# Patient Record
Sex: FEMALE | Race: BLACK OR AFRICAN AMERICAN | NOT HISPANIC OR LATINO | Employment: FULL TIME | URBAN - METROPOLITAN AREA
[De-identification: names, ages, dates, MRNs, and addresses within clinical notes are randomized per-mention and may not be internally consistent; named-entity substitution may affect disease eponyms.]

---

## 2017-04-03 ENCOUNTER — HISTORICAL (OUTPATIENT)
Dept: ADMINISTRATIVE | Facility: HOSPITAL | Age: 47
End: 2017-04-03

## 2017-04-17 ENCOUNTER — HISTORICAL (OUTPATIENT)
Dept: INTERNAL MEDICINE | Facility: CLINIC | Age: 47
End: 2017-04-17

## 2017-04-19 ENCOUNTER — HISTORICAL (OUTPATIENT)
Dept: INTERNAL MEDICINE | Facility: CLINIC | Age: 47
End: 2017-04-19

## 2017-04-25 LAB
HIGH RISK HPV 16 (PRECISION): NEGATIVE
HIGH RISK HPV 18/45 (PRECISION): NEGATIVE
PAP RECOMMENDATION EXT: NORMAL
PAP SMEAR: NORMAL

## 2017-05-05 ENCOUNTER — HISTORICAL (OUTPATIENT)
Dept: ADMINISTRATIVE | Facility: HOSPITAL | Age: 47
End: 2017-05-05

## 2017-07-13 ENCOUNTER — HISTORICAL (OUTPATIENT)
Dept: LAB | Facility: HOSPITAL | Age: 47
End: 2017-07-13

## 2017-07-13 LAB
HBV SURFACE AB SER-ACNC: 124.2 M[IU]/ML
HBV SURFACE AB SERPL IA-ACNC: REACTIVE M[IU]/ML

## 2017-11-14 ENCOUNTER — HISTORICAL (OUTPATIENT)
Dept: INTERNAL MEDICINE | Facility: CLINIC | Age: 47
End: 2017-11-14

## 2017-11-14 LAB — B-HCG SERPL QL: NEGATIVE

## 2017-12-29 ENCOUNTER — HOSPITAL ENCOUNTER (OUTPATIENT)
Dept: MEDSURG UNIT | Facility: HOSPITAL | Age: 47
End: 2017-12-30
Attending: INTERNAL MEDICINE | Admitting: INTERNAL MEDICINE

## 2017-12-29 LAB
ABS NEUT (OLG): 4.17 X10(3)/MCL (ref 2.1–9.2)
ALBUMIN SERPL-MCNC: 3.4 GM/DL (ref 3.4–5)
ALBUMIN/GLOB SERPL: 1 RATIO (ref 1–2)
ALP SERPL-CCNC: 104 UNIT/L (ref 45–117)
ALT SERPL-CCNC: 25 UNIT/L (ref 12–78)
ANISOCYTOSIS BLD QL SMEAR: NORMAL
AST SERPL-CCNC: 21 UNIT/L (ref 15–37)
BASOPHILS # BLD AUTO: 0.06 X10(3)/MCL
BASOPHILS NFR BLD AUTO: 1 % (ref 0–1)
BILIRUB SERPL-MCNC: 0.2 MG/DL (ref 0.2–1)
BILIRUBIN DIRECT+TOT PNL SERPL-MCNC: <0.1 MG/DL
BILIRUBIN DIRECT+TOT PNL SERPL-MCNC: ABNORMAL MG/DL
BUN SERPL-MCNC: 12 MG/DL (ref 7–18)
CALCIUM SERPL-MCNC: 8.3 MG/DL (ref 8.5–10.1)
CHLORIDE SERPL-SCNC: 110 MMOL/L (ref 98–107)
CO2 SERPL-SCNC: 27 MMOL/L (ref 21–32)
CREAT SERPL-MCNC: 0.7 MG/DL (ref 0.6–1.3)
CROSSMATCH INTERPRETATION: NORMAL
DACRYOCYTES BLD QL SMEAR: NORMAL
EOSINOPHIL # BLD AUTO: 0.15 X10(3)/MCL
EOSINOPHIL NFR BLD AUTO: 2 % (ref 0–5)
ERYTHROCYTE [DISTWIDTH] IN BLOOD BY AUTOMATED COUNT: 22.2 % (ref 11.5–14.5)
GLOBULIN SER-MCNC: 4.2 GM/ML (ref 2.3–3.5)
GLUCOSE SERPL-MCNC: 100 MG/DL (ref 74–106)
GROUP & RH: NORMAL
HCT VFR BLD AUTO: 26 % (ref 35–46)
HGB BLD-MCNC: 7.2 GM/DL (ref 12–16)
HYPOCHROMIA BLD QL SMEAR: NORMAL
IMM GRANULOCYTES # BLD AUTO: 0.03 10*3/UL
IMM GRANULOCYTES NFR BLD AUTO: 0 %
LYMPHOCYTES # BLD AUTO: 2.95 X10(3)/MCL
LYMPHOCYTES NFR BLD AUTO: 36 % (ref 15–40)
MACROCYTES BLD QL SMEAR: NORMAL
MCH RBC QN AUTO: 17.4 PG (ref 26–34)
MCHC RBC AUTO-ENTMCNC: 27.7 GM/DL (ref 31–37)
MCV RBC AUTO: 63 FL (ref 80–100)
MICROCYTES BLD QL SMEAR: NORMAL
MONOCYTES # BLD AUTO: 0.73 X10(3)/MCL
MONOCYTES NFR BLD AUTO: 9 % (ref 4–12)
NEUTROPHILS # BLD AUTO: 4.17 X10(3)/MCL
NEUTROPHILS NFR BLD AUTO: 52 X10(3)/MCL
OVALOCYTES BLD QL SMEAR: NORMAL
PLATELET # BLD AUTO: 374 X10(3)/MCL (ref 130–400)
PLATELET # BLD EST: NORMAL 10*3/UL
PMV BLD AUTO: ABNORMAL FL (ref 7.4–10.4)
POC BETA-HCG (QUAL): NEGATIVE
POIKILOCYTOSIS BLD QL SMEAR: NORMAL
POLYCHROMASIA BLD QL SMEAR: NORMAL
POTASSIUM SERPL-SCNC: 3.4 MMOL/L (ref 3.5–5.1)
PRODUCT READY: NORMAL
PROT SERPL-MCNC: 7.6 GM/DL (ref 6.4–8.2)
RBC # BLD AUTO: 4.13 X10(6)/MCL (ref 4–5.2)
RBC MORPH BLD: NORMAL
SODIUM SERPL-SCNC: 144 MMOL/L (ref 136–145)
TRANSFUSION ORDER: NORMAL
WBC # SPEC AUTO: 8.1 X10(3)/MCL (ref 4.5–11)

## 2017-12-30 LAB
ABS NEUT (OLG): 4.34 X10(3)/MCL (ref 2.1–9.2)
BASOPHILS # BLD AUTO: 0.08 X10(3)/MCL
BASOPHILS NFR BLD AUTO: 1 % (ref 0–1)
BUN SERPL-MCNC: 8 MG/DL (ref 7–18)
CALCIUM SERPL-MCNC: 8 MG/DL (ref 8.5–10.1)
CHLORIDE SERPL-SCNC: 109 MMOL/L (ref 98–107)
CO2 SERPL-SCNC: 27 MMOL/L (ref 21–32)
CREAT SERPL-MCNC: 0.6 MG/DL (ref 0.6–1.3)
EOSINOPHIL # BLD AUTO: 0.1 10*3/UL
EOSINOPHIL NFR BLD AUTO: 1 % (ref 0–5)
ERYTHROCYTE [DISTWIDTH] IN BLOOD BY AUTOMATED COUNT: 23.9 % (ref 11.5–14.5)
GLUCOSE SERPL-MCNC: 88 MG/DL (ref 74–106)
HCT VFR BLD AUTO: 35.3 % (ref 35–46)
HGB BLD-MCNC: 10.3 GM/DL (ref 12–16)
IMM GRANULOCYTES # BLD AUTO: 0.06 10*3/UL
IMM GRANULOCYTES NFR BLD AUTO: 1 %
LYMPHOCYTES # BLD AUTO: 2.41 X10(3)/MCL
LYMPHOCYTES NFR BLD AUTO: 32 % (ref 15–40)
MCH RBC QN AUTO: 19.7 PG (ref 26–34)
MCHC RBC AUTO-ENTMCNC: 29.2 GM/DL (ref 31–37)
MCV RBC AUTO: 67.4 FL (ref 80–100)
MONOCYTES # BLD AUTO: 0.64 X10(3)/MCL
MONOCYTES NFR BLD AUTO: 8 % (ref 4–12)
NEUTROPHILS # BLD AUTO: 4.34 X10(3)/MCL
NEUTROPHILS NFR BLD AUTO: 57 X10(3)/MCL
PLATELET # BLD AUTO: ABNORMAL 10*3/UL (ref 130–400)
PMV BLD AUTO: ABNORMAL FL (ref 7.4–10.4)
POTASSIUM SERPL-SCNC: 3.9 MMOL/L (ref 3.5–5.1)
RBC # BLD AUTO: 5.24 X10(6)/MCL (ref 4–5.2)
SODIUM SERPL-SCNC: 142 MMOL/L (ref 136–145)
WBC # SPEC AUTO: 7.6 X10(3)/MCL (ref 4.5–11)

## 2018-01-31 LAB — POC BETA-HCG (QUAL): NEGATIVE

## 2018-05-07 ENCOUNTER — HISTORICAL (OUTPATIENT)
Dept: INTERNAL MEDICINE | Facility: CLINIC | Age: 48
End: 2018-05-07

## 2018-05-24 ENCOUNTER — HISTORICAL (OUTPATIENT)
Dept: ADMINISTRATIVE | Facility: HOSPITAL | Age: 48
End: 2018-05-24

## 2018-05-24 LAB
ABS NEUT (OLG): 5.42 X10(3)/MCL (ref 2.1–9.2)
APPEARANCE, UA: CLEAR
BACTERIA #/AREA URNS AUTO: ABNORMAL /[HPF]
BASOPHILS # BLD AUTO: 0.05 X10(3)/MCL
BASOPHILS NFR BLD AUTO: 1 %
BILIRUB UR QL STRIP: NEGATIVE
CHOLEST SERPL-MCNC: 161 MG/DL
CHOLEST/HDLC SERPL: 5.4 {RATIO} (ref 0–4.4)
COLOR UR: ABNORMAL
EOSINOPHIL # BLD AUTO: 0.15 10*3/UL
EOSINOPHIL NFR BLD AUTO: 2 %
ERYTHROCYTE [DISTWIDTH] IN BLOOD BY AUTOMATED COUNT: 14.1 % (ref 11.5–14.5)
GLUCOSE (UA): NORMAL
HCT VFR BLD AUTO: 31 % (ref 35–46)
HDLC SERPL-MCNC: 30 MG/DL
HGB BLD-MCNC: 10 GM/DL (ref 12–16)
HGB UR QL STRIP: NEGATIVE
HIV 1+2 AB+HIV1 P24 AG SERPL QL IA: NONREACTIVE
HYALINE CASTS #/AREA URNS LPF: ABNORMAL /[LPF]
IMM GRANULOCYTES # BLD AUTO: 0.09 10*3/UL
IMM GRANULOCYTES NFR BLD AUTO: 1 %
KETONES UR QL STRIP: NEGATIVE
LDLC SERPL CALC-MCNC: 107 MG/DL (ref 0–130)
LEUKOCYTE ESTERASE UR QL STRIP: NEGATIVE
LYMPHOCYTES # BLD AUTO: 2.58 X10(3)/MCL
LYMPHOCYTES NFR BLD AUTO: 29 % (ref 13–40)
MCH RBC QN AUTO: 28.5 PG (ref 26–34)
MCHC RBC AUTO-ENTMCNC: 32.3 GM/DL (ref 31–37)
MCV RBC AUTO: 88.3 FL (ref 80–100)
MONOCYTES # BLD AUTO: 0.62 X10(3)/MCL
MONOCYTES NFR BLD AUTO: 7 % (ref 4–12)
NEUTROPHILS # BLD AUTO: 5.42 X10(3)/MCL
NEUTROPHILS NFR BLD AUTO: 61 X10(3)/MCL
NITRITE UR QL STRIP: NEGATIVE
PH UR STRIP: 8 [PH] (ref 4.5–8)
PLATELET # BLD AUTO: 337 X10(3)/MCL (ref 130–400)
PMV BLD AUTO: 11.7 FL (ref 7.4–10.4)
PROT UR QL STRIP: NEGATIVE
RBC # BLD AUTO: 3.51 X10(6)/MCL (ref 4–5.2)
RBC #/AREA URNS AUTO: ABNORMAL /[HPF]
SP GR UR STRIP: 1.02 (ref 1–1.03)
SQUAMOUS #/AREA URNS LPF: ABNORMAL /[LPF]
TRIGL SERPL-MCNC: 121 MG/DL
TSH SERPL-ACNC: 0.74 MIU/L (ref 0.36–3.74)
UROBILINOGEN UR STRIP-ACNC: 4 MG/DL
VLDLC SERPL CALC-MCNC: 24 MG/DL
WBC # SPEC AUTO: 8.9 X10(3)/MCL (ref 4.5–11)
WBC #/AREA URNS AUTO: ABNORMAL /HPF

## 2019-01-08 ENCOUNTER — HISTORICAL (OUTPATIENT)
Dept: INTERNAL MEDICINE | Facility: CLINIC | Age: 49
End: 2019-01-08

## 2019-01-08 LAB
ABS NEUT (OLG): 4.13 X10(3)/MCL (ref 2.1–9.2)
BASOPHILS # BLD AUTO: 0.08 X10(3)/MCL
BASOPHILS NFR BLD AUTO: 1 %
EOSINOPHIL # BLD AUTO: 0.17 10*3/UL
EOSINOPHIL NFR BLD AUTO: 2 %
ERYTHROCYTE [DISTWIDTH] IN BLOOD BY AUTOMATED COUNT: 13 % (ref 11.5–14.5)
EST. AVERAGE GLUCOSE BLD GHB EST-MCNC: 143 MG/DL
HAV IGM SERPL QL IA: NONREACTIVE
HBA1C MFR BLD: 6.6 % (ref 4.2–6.3)
HBV CORE IGM SERPL QL IA: NONREACTIVE
HBV SURFACE AG SERPL QL IA: NEGATIVE
HCT VFR BLD AUTO: 39.2 % (ref 35–46)
HCV AB SERPL QL IA: NONREACTIVE
HGB BLD-MCNC: 12.3 GM/DL (ref 12–16)
IMM GRANULOCYTES # BLD AUTO: 0.06 10*3/UL
IMM GRANULOCYTES NFR BLD AUTO: 1 %
LYMPHOCYTES # BLD AUTO: 2.58 X10(3)/MCL
LYMPHOCYTES NFR BLD AUTO: 33 % (ref 13–40)
MCH RBC QN AUTO: 26.9 PG (ref 26–34)
MCHC RBC AUTO-ENTMCNC: 31.4 GM/DL (ref 31–37)
MCV RBC AUTO: 85.6 FL (ref 80–100)
MONOCYTES # BLD AUTO: 0.76 X10(3)/MCL
MONOCYTES NFR BLD AUTO: 10 % (ref 4–12)
NEUTROPHILS # BLD AUTO: 4.13 X10(3)/MCL
NEUTROPHILS NFR BLD AUTO: 53 X10(3)/MCL
PLATELET # BLD AUTO: 298 X10(3)/MCL (ref 130–400)
PMV BLD AUTO: 11.9 FL (ref 7.4–10.4)
RBC # BLD AUTO: 4.58 X10(6)/MCL (ref 4–5.2)
WBC # SPEC AUTO: 7.8 X10(3)/MCL (ref 4.5–11)

## 2019-05-09 ENCOUNTER — HISTORICAL (OUTPATIENT)
Dept: RADIOLOGY | Facility: HOSPITAL | Age: 49
End: 2019-05-09

## 2019-07-03 ENCOUNTER — HISTORICAL (OUTPATIENT)
Dept: INTERNAL MEDICINE | Facility: CLINIC | Age: 49
End: 2019-07-03

## 2019-07-03 LAB
ABS NEUT (OLG): 3.83 X10(3)/MCL (ref 2.1–9.2)
ALBUMIN SERPL-MCNC: 3.7 GM/DL (ref 3.4–5)
ALBUMIN/GLOB SERPL: 1 RATIO (ref 1.1–2)
ALP SERPL-CCNC: 95 UNIT/L (ref 45–117)
ALT SERPL-CCNC: 34 UNIT/L (ref 12–78)
APPEARANCE, UA: ABNORMAL
AST SERPL-CCNC: 27 UNIT/L (ref 15–37)
BACTERIA #/AREA URNS AUTO: ABNORMAL /[HPF]
BASOPHILS # BLD AUTO: 0.06 X10(3)/MCL
BASOPHILS NFR BLD AUTO: 1 %
BILIRUB SERPL-MCNC: 0.5 MG/DL (ref 0.2–1)
BILIRUB UR QL STRIP: NEGATIVE
BILIRUBIN DIRECT+TOT PNL SERPL-MCNC: 0.1 MG/DL
BILIRUBIN DIRECT+TOT PNL SERPL-MCNC: 0.4 MG/DL
BUN SERPL-MCNC: 10 MG/DL (ref 7–18)
CALCIUM SERPL-MCNC: 8.9 MG/DL (ref 8.5–10.1)
CHLORIDE SERPL-SCNC: 105 MMOL/L (ref 98–107)
CHOLEST SERPL-MCNC: 177 MG/DL
CHOLEST/HDLC SERPL: 4.8 {RATIO} (ref 0–4.4)
CO2 SERPL-SCNC: 29 MMOL/L (ref 21–32)
COLOR UR: YELLOW
CREAT SERPL-MCNC: 0.7 MG/DL (ref 0.6–1.3)
CREAT UR-MCNC: 243 MG/DL
EOSINOPHIL # BLD AUTO: 0.12 10*3/UL
EOSINOPHIL NFR BLD AUTO: 2 %
ERYTHROCYTE [DISTWIDTH] IN BLOOD BY AUTOMATED COUNT: 13.6 % (ref 11.5–14.5)
EST. AVERAGE GLUCOSE BLD GHB EST-MCNC: 131 MG/DL
GLOBULIN SER-MCNC: 3.6 GM/ML (ref 2.3–3.5)
GLUCOSE (UA): NORMAL
GLUCOSE SERPL-MCNC: 87 MG/DL (ref 74–106)
HBA1C MFR BLD: 6.2 % (ref 4.2–6.3)
HCT VFR BLD AUTO: 37.5 % (ref 35–46)
HDLC SERPL-MCNC: 37 MG/DL
HGB BLD-MCNC: 12.3 GM/DL (ref 12–16)
HGB UR QL STRIP: NEGATIVE
HYALINE CASTS #/AREA URNS LPF: ABNORMAL /[LPF]
IMM GRANULOCYTES # BLD AUTO: 0.02 10*3/UL
IMM GRANULOCYTES NFR BLD AUTO: 0 %
KETONES UR QL STRIP: NEGATIVE
LDLC SERPL CALC-MCNC: 123 MG/DL (ref 0–130)
LEUKOCYTE ESTERASE UR QL STRIP: 500 LEU/UL
LYMPHOCYTES # BLD AUTO: 2.94 X10(3)/MCL
LYMPHOCYTES NFR BLD AUTO: 39 % (ref 13–40)
MCH RBC QN AUTO: 27.6 PG (ref 26–34)
MCHC RBC AUTO-ENTMCNC: 32.8 GM/DL (ref 31–37)
MCV RBC AUTO: 84.1 FL (ref 80–100)
MICROALBUMIN UR-MCNC: 16.6 MG/L (ref 0–19)
MICROALBUMIN/CREAT RATIO PNL UR: 6.8 MCG/MG CR (ref 0–29)
MONOCYTES # BLD AUTO: 0.62 X10(3)/MCL
MONOCYTES NFR BLD AUTO: 8 % (ref 4–12)
NEUTROPHILS # BLD AUTO: 3.83 X10(3)/MCL
NEUTROPHILS NFR BLD AUTO: 50 X10(3)/MCL
NITRITE UR QL STRIP: NEGATIVE
PH UR STRIP: 5.5 [PH] (ref 4.5–8)
PLATELET # BLD AUTO: 269 X10(3)/MCL (ref 130–400)
PMV BLD AUTO: 11.3 FL (ref 7.4–10.4)
POTASSIUM SERPL-SCNC: 3.4 MMOL/L (ref 3.5–5.1)
PROT SERPL-MCNC: 7.3 GM/DL (ref 6.4–8.2)
PROT UR QL STRIP: 10 MG/DL
RBC # BLD AUTO: 4.46 X10(6)/MCL (ref 4–5.2)
RBC #/AREA URNS AUTO: ABNORMAL /[HPF]
SODIUM SERPL-SCNC: 140 MMOL/L (ref 136–145)
SP GR UR STRIP: 1.02 (ref 1–1.03)
SQUAMOUS #/AREA URNS LPF: >100 /[LPF]
T4 FREE SERPL-MCNC: 0.85 NG/DL (ref 0.76–1.46)
TRIGL SERPL-MCNC: 86 MG/DL
TSH SERPL-ACNC: 4.09 MIU/L (ref 0.36–3.74)
UROBILINOGEN UR STRIP-ACNC: NORMAL
VLDLC SERPL CALC-MCNC: 17 MG/DL
WBC # SPEC AUTO: 7.6 X10(3)/MCL (ref 4.5–11)
WBC #/AREA URNS AUTO: ABNORMAL /HPF

## 2020-02-12 ENCOUNTER — HISTORICAL (OUTPATIENT)
Dept: RADIOLOGY | Facility: HOSPITAL | Age: 50
End: 2020-02-12

## 2020-02-12 LAB
EST. AVERAGE GLUCOSE BLD GHB EST-MCNC: 140 MG/DL
HBA1C MFR BLD: 6.5 % (ref 4.2–6.3)

## 2020-02-23 LAB
LEFT EYE DM RETINOPATHY: NEGATIVE
RIGHT EYE DM RETINOPATHY: NEGATIVE

## 2020-04-30 ENCOUNTER — HISTORICAL (OUTPATIENT)
Dept: INTERNAL MEDICINE | Facility: CLINIC | Age: 50
End: 2020-04-30

## 2020-04-30 LAB
ABS NEUT (OLG): 4.82 X10(3)/MCL (ref 2.1–9.2)
APPEARANCE, UA: CLEAR
BACTERIA #/AREA URNS AUTO: ABNORMAL /HPF
BASOPHILS # BLD AUTO: 0.1 X10(3)/MCL (ref 0–0.2)
BASOPHILS NFR BLD AUTO: 1 %
BILIRUB UR QL STRIP: NEGATIVE
CHOLEST SERPL-MCNC: 179 MG/DL
CHOLEST/HDLC SERPL: 4.8 {RATIO} (ref 0–4.4)
COLOR UR: YELLOW
CREAT UR-MCNC: 257 MG/DL
EOSINOPHIL # BLD AUTO: 0.1 X10(3)/MCL (ref 0–0.9)
EOSINOPHIL NFR BLD AUTO: 2 %
ERYTHROCYTE [DISTWIDTH] IN BLOOD BY AUTOMATED COUNT: 21 % (ref 11.5–14.5)
EST. AVERAGE GLUCOSE BLD GHB EST-MCNC: 103 MG/DL
GLUCOSE (UA): NEGATIVE
HBA1C MFR BLD: 5.2 % (ref 4.2–6.3)
HCT VFR BLD AUTO: 34.4 % (ref 35–46)
HDLC SERPL-MCNC: 37 MG/DL (ref 40–59)
HGB BLD-MCNC: 9.9 GM/DL (ref 12–16)
HGB UR QL STRIP: NEGATIVE
HYALINE CASTS #/AREA URNS LPF: ABNORMAL /LPF
IMM GRANULOCYTES # BLD AUTO: 0.02 10*3/UL
IMM GRANULOCYTES NFR BLD AUTO: 0 %
KETONES UR QL STRIP: ABNORMAL
LDLC SERPL CALC-MCNC: 122 MG/DL
LEUKOCYTE ESTERASE UR QL STRIP: 75 LEU/UL
LYMPHOCYTES # BLD AUTO: 2 X10(3)/MCL (ref 0.6–4.6)
LYMPHOCYTES NFR BLD AUTO: 26 %
MCH RBC QN AUTO: 21.1 PG (ref 26–34)
MCHC RBC AUTO-ENTMCNC: 28.8 GM/DL (ref 31–37)
MCV RBC AUTO: 73.3 FL (ref 80–100)
MICROALBUMIN UR-MCNC: 10.3 MG/L (ref 0–19)
MICROALBUMIN/CREAT RATIO PNL UR: 4 MCG/MG CR (ref 0–29)
MONOCYTES # BLD AUTO: 0.7 X10(3)/MCL (ref 0.1–1.3)
MONOCYTES NFR BLD AUTO: 9 %
NEUTROPHILS # BLD AUTO: 4.82 X10(3)/MCL (ref 2.1–9.2)
NEUTROPHILS NFR BLD AUTO: 62 %
NITRITE UR QL STRIP: NEGATIVE
PH UR STRIP: 5.5 [PH] (ref 4.5–8)
PLATELET # BLD AUTO: 347 X10(3)/MCL (ref 130–400)
PMV BLD AUTO: ABNORMAL FL (ref 7.4–10.4)
PROT UR QL STRIP: NEGATIVE
RBC # BLD AUTO: 4.69 X10(6)/MCL (ref 4–5.2)
RBC #/AREA URNS AUTO: ABNORMAL /HPF
SP GR UR STRIP: 1.02 (ref 1–1.03)
SQUAMOUS #/AREA URNS LPF: >100 /LPF
T4 FREE SERPL-MCNC: 0.74 NG/DL (ref 0.76–1.46)
TRIGL SERPL-MCNC: 102 MG/DL
TSH SERPL-ACNC: 2.01 MIU/L (ref 0.36–3.74)
UROBILINOGEN UR STRIP-ACNC: NORMAL
VLDLC SERPL CALC-MCNC: 20 MG/DL
WBC # SPEC AUTO: 7.8 X10(3)/MCL (ref 4.5–11)
WBC #/AREA URNS AUTO: ABNORMAL /HPF

## 2020-05-02 LAB — FINAL CULTURE: NO GROWTH

## 2020-07-29 ENCOUNTER — HISTORICAL (OUTPATIENT)
Dept: INTERNAL MEDICINE | Facility: CLINIC | Age: 50
End: 2020-07-29

## 2020-07-29 LAB
EST. AVERAGE GLUCOSE BLD GHB EST-MCNC: 134 MG/DL
HBA1C MFR BLD: 6.3 % (ref 4.2–6.3)

## 2020-11-02 ENCOUNTER — HISTORICAL (OUTPATIENT)
Dept: INTERNAL MEDICINE | Facility: CLINIC | Age: 50
End: 2020-11-02

## 2020-11-02 LAB
ABS NEUT (OLG): 4.23 X10(3)/MCL (ref 2.1–9.2)
APPEARANCE, UA: CLEAR
BACTERIA #/AREA URNS AUTO: ABNORMAL /HPF
BASOPHILS # BLD AUTO: 0 X10(3)/MCL (ref 0–0.2)
BASOPHILS NFR BLD AUTO: 1 %
BILIRUB UR QL STRIP: NEGATIVE
BUN SERPL-MCNC: 11 MG/DL (ref 7–18.7)
CALCIUM SERPL-MCNC: 9.3 MG/DL (ref 8.4–10.2)
CHLORIDE SERPL-SCNC: 102 MMOL/L (ref 98–107)
CO2 SERPL-SCNC: 28 MMOL/L (ref 22–29)
COLOR UR: YELLOW
CREAT SERPL-MCNC: 0.76 MG/DL (ref 0.55–1.02)
CREAT/UREA NIT SERPL: 14
EOSINOPHIL # BLD AUTO: 0.1 X10(3)/MCL (ref 0–0.9)
EOSINOPHIL NFR BLD AUTO: 2 %
ERYTHROCYTE [DISTWIDTH] IN BLOOD BY AUTOMATED COUNT: 14.6 % (ref 11.5–14.5)
GLUCOSE (UA): NEGATIVE
GLUCOSE SERPL-MCNC: 104 MG/DL (ref 74–100)
HCT VFR BLD AUTO: 38.5 % (ref 35–46)
HGB BLD-MCNC: 12.4 GM/DL (ref 12–16)
HGB UR QL STRIP: NEGATIVE
HYALINE CASTS #/AREA URNS LPF: ABNORMAL /LPF
IMM GRANULOCYTES # BLD AUTO: 0.03 10*3/UL
IMM GRANULOCYTES NFR BLD AUTO: 0 %
KETONES UR QL STRIP: NEGATIVE
LEUKOCYTE ESTERASE UR QL STRIP: NEGATIVE
LYMPHOCYTES # BLD AUTO: 2.2 X10(3)/MCL (ref 0.6–4.6)
LYMPHOCYTES NFR BLD AUTO: 30 %
MCH RBC QN AUTO: 27.9 PG (ref 26–34)
MCHC RBC AUTO-ENTMCNC: 32.2 GM/DL (ref 31–37)
MCV RBC AUTO: 86.7 FL (ref 80–100)
MONOCYTES # BLD AUTO: 0.7 X10(3)/MCL (ref 0.1–1.3)
MONOCYTES NFR BLD AUTO: 9 %
NEUTROPHILS # BLD AUTO: 4.23 X10(3)/MCL (ref 2.1–9.2)
NEUTROPHILS NFR BLD AUTO: 58 %
NITRITE UR QL STRIP: NEGATIVE
PH UR STRIP: 6 [PH] (ref 4.5–8)
PLATELET # BLD AUTO: 256 X10(3)/MCL (ref 130–400)
PMV BLD AUTO: 12.7 FL (ref 7.4–10.4)
POTASSIUM SERPL-SCNC: 4 MMOL/L (ref 3.5–5.1)
PROT UR QL STRIP: NEGATIVE
RBC # BLD AUTO: 4.44 X10(6)/MCL (ref 4–5.2)
RBC #/AREA URNS AUTO: ABNORMAL /HPF
SODIUM SERPL-SCNC: 139 MMOL/L (ref 136–145)
SP GR UR STRIP: 1.03 (ref 1–1.03)
SQUAMOUS #/AREA URNS LPF: ABNORMAL /LPF
T4 FREE SERPL-MCNC: 0.79 NG/DL (ref 0.7–1.48)
TSH SERPL-ACNC: 1.96 UIU/ML (ref 0.35–4.94)
UROBILINOGEN UR STRIP-ACNC: NORMAL
WBC # SPEC AUTO: 7.3 X10(3)/MCL (ref 4.5–11)
WBC #/AREA URNS AUTO: ABNORMAL /HPF

## 2020-11-04 LAB — FINAL CULTURE: NORMAL

## 2021-02-03 ENCOUNTER — HISTORICAL (OUTPATIENT)
Dept: ADMINISTRATIVE | Facility: HOSPITAL | Age: 51
End: 2021-02-03

## 2021-02-03 LAB
BUN SERPL-MCNC: 8 MG/DL (ref 9.8–20.1)
CALCIUM SERPL-MCNC: 9.3 MG/DL (ref 8.4–10.2)
CHLORIDE SERPL-SCNC: 103 MMOL/L (ref 98–107)
CO2 SERPL-SCNC: 27 MMOL/L (ref 22–29)
CREAT SERPL-MCNC: 0.77 MG/DL (ref 0.55–1.02)
CREAT/UREA NIT SERPL: 10
EST. AVERAGE GLUCOSE BLD GHB EST-MCNC: 114 MG/DL
GLUCOSE SERPL-MCNC: 94 MG/DL (ref 74–100)
HBA1C MFR BLD: 5.6 %
POTASSIUM SERPL-SCNC: 3.9 MMOL/L (ref 3.5–5.1)
SODIUM SERPL-SCNC: 140 MMOL/L (ref 136–145)

## 2021-03-15 ENCOUNTER — HISTORICAL (OUTPATIENT)
Dept: RADIOLOGY | Facility: HOSPITAL | Age: 51
End: 2021-03-15

## 2022-01-18 ENCOUNTER — HISTORICAL (OUTPATIENT)
Dept: INTERNAL MEDICINE | Facility: CLINIC | Age: 52
End: 2022-01-18

## 2022-01-18 LAB
ABS NEUT (OLG): 3.75 X10(3)/MCL (ref 2.1–9.2)
ALBUMIN SERPL-MCNC: 4 GM/DL (ref 3.5–5)
ALBUMIN/GLOB SERPL: 1 RATIO (ref 1.1–2)
ALP SERPL-CCNC: 72 UNIT/L (ref 40–150)
ALT SERPL-CCNC: 19 UNIT/L (ref 0–55)
APPEARANCE, UA: CLEAR
AST SERPL-CCNC: 21 UNIT/L (ref 5–34)
BACTERIA SPEC CULT: ABNORMAL
BASOPHILS # BLD AUTO: 0 X10(3)/MCL (ref 0–0.2)
BASOPHILS NFR BLD AUTO: 1 %
BILIRUB SERPL-MCNC: 0.3 MG/DL
BILIRUB UR QL STRIP: NEGATIVE
BILIRUBIN DIRECT+TOT PNL SERPL-MCNC: 0.1 MG/DL (ref 0–0.5)
BILIRUBIN DIRECT+TOT PNL SERPL-MCNC: 0.2 MG/DL (ref 0–0.8)
BUN SERPL-MCNC: 10.8 MG/DL (ref 9.8–20.1)
CALCIUM SERPL-MCNC: 9.5 MG/DL (ref 8.7–10.5)
CHLORIDE SERPL-SCNC: 107 MMOL/L (ref 98–107)
CHOLEST SERPL-MCNC: 207 MG/DL
CHOLEST/HDLC SERPL: 5 {RATIO} (ref 0–5)
CO2 SERPL-SCNC: 26 MMOL/L (ref 22–29)
COLOR UR: ABNORMAL
CREAT SERPL-MCNC: 0.69 MG/DL (ref 0.55–1.02)
CREAT UR-MCNC: 197.3 MG/DL (ref 45–106)
EOSINOPHIL # BLD AUTO: 0.1 X10(3)/MCL (ref 0–0.9)
EOSINOPHIL NFR BLD AUTO: 1 %
ERYTHROCYTE [DISTWIDTH] IN BLOOD BY AUTOMATED COUNT: 17.1 % (ref 11.5–14.5)
EST. AVERAGE GLUCOSE BLD GHB EST-MCNC: 111.2 MG/DL
GLOBULIN SER-MCNC: 3.9 GM/DL (ref 2.4–3.5)
GLUCOSE (UA): NORMAL /UL
GLUCOSE SERPL-MCNC: 97 MG/DL (ref 74–100)
HBA1C MFR BLD: 5.5 %
HCT VFR BLD AUTO: 31.8 % (ref 35–46)
HDLC SERPL-MCNC: 44 MG/DL (ref 35–60)
HGB BLD-MCNC: 9.4 GM/DL (ref 12–16)
HGB UR QL STRIP: NEGATIVE /HPF
HYALINE CASTS #/AREA URNS LPF: ABNORMAL /LPF
IMM GRANULOCYTES # BLD AUTO: 0.05 10*3/UL
IMM GRANULOCYTES NFR BLD AUTO: 1 %
KETONES UR QL STRIP: NEGATIVE /UL
LDLC SERPL CALC-MCNC: 145 MG/DL (ref 50–140)
LEUKOCYTE ESTERASE UR QL STRIP: 75
LYMPHOCYTES # BLD AUTO: 2.3 X10(3)/MCL (ref 0.6–4.6)
LYMPHOCYTES NFR BLD AUTO: 34 %
MCH RBC QN AUTO: 23.9 PG (ref 26–34)
MCHC RBC AUTO-ENTMCNC: 29.6 GM/DL (ref 31–37)
MCV RBC AUTO: 80.7 FL (ref 80–100)
MICROALBUMIN UR-MCNC: 9.5 MG/L
MICROALBUMIN/CREAT RATIO PNL UR: 4.8 MG/GM CR (ref 0–30)
MONOCYTES # BLD AUTO: 0.7 X10(3)/MCL (ref 0.1–1.3)
MONOCYTES NFR BLD AUTO: 10 %
MUCOUS THREADS URNS QL MICRO: ABNORMAL /LPF
NEUTROPHILS # BLD AUTO: 3.75 X10(3)/MCL (ref 2.1–9.2)
NEUTROPHILS NFR BLD AUTO: 54 %
NITRITE UR QL STRIP: NEGATIVE
NRBC BLD AUTO-RTO: 0 % (ref 0–0.2)
PH UR STRIP: 6 /UL (ref 4.5–8)
PLATELET # BLD AUTO: 364 X10(3)/MCL (ref 130–400)
PMV BLD AUTO: 12 FL (ref 7.4–10.4)
POTASSIUM SERPL-SCNC: 4 MMOL/L (ref 3.5–5.1)
PROT SERPL-MCNC: 7.9 GM/DL (ref 6.4–8.3)
PROT UR QL STRIP: ABNORMAL /UL
RBC # BLD AUTO: 3.94 X10(6)/MCL (ref 4–5.2)
RBC #/AREA URNS HPF: ABNORMAL /HPF
SODIUM SERPL-SCNC: 140 MMOL/L (ref 136–145)
SP GR UR STRIP: 1.03 (ref 1–1.03)
SQUAMOUS EPITHELIAL, UA: ABNORMAL /HPF
T4 FREE SERPL-MCNC: 0.85 NG/DL (ref 0.7–1.48)
TRIGL SERPL-MCNC: 88 MG/DL (ref 37–140)
TSH SERPL-ACNC: 1.81 UIU/ML (ref 0.35–4.94)
UROBILINOGEN UR STRIP-ACNC: NORMAL /HPF
VLDLC SERPL CALC-MCNC: 18 MG/DL
WBC # SPEC AUTO: 7 X10(3)/MCL (ref 4.5–11)
WBC #/AREA URNS HPF: ABNORMAL /HPF

## 2022-01-20 LAB — FINAL CULTURE: NORMAL

## 2022-03-18 ENCOUNTER — HISTORICAL (OUTPATIENT)
Dept: RADIOLOGY | Facility: HOSPITAL | Age: 52
End: 2022-03-18

## 2022-03-18 ENCOUNTER — HISTORICAL (OUTPATIENT)
Dept: ADMINISTRATIVE | Facility: HOSPITAL | Age: 52
End: 2022-03-18

## 2022-04-04 LAB — HEMOCCULT STL QL IA: NEGATIVE

## 2022-04-10 ENCOUNTER — HISTORICAL (OUTPATIENT)
Dept: ADMINISTRATIVE | Facility: HOSPITAL | Age: 52
End: 2022-04-10
Payer: MEDICAID

## 2022-04-24 VITALS
DIASTOLIC BLOOD PRESSURE: 75 MMHG | OXYGEN SATURATION: 100 % | WEIGHT: 156.5 LBS | HEIGHT: 60 IN | BODY MASS INDEX: 30.73 KG/M2 | SYSTOLIC BLOOD PRESSURE: 110 MMHG

## 2022-04-30 NOTE — ED PROVIDER NOTES
Patient:   Sandra Vega             MRN: [id number]            FIN: [id number]               Age:   47 years     Sex:  Female     :  1970   Associated Diagnoses:   Symptomatic anemia   Author:   Julio Nunes MD      Basic Information   Time seen: Date & time 2017 22:35:00.   History source: Patient.   Arrival mode: Private vehicle.   History limitation: None.   Additional information: Chief Complaint from Nursing Triage Note : Chief Complaint   2017 22:00 CST     Chief Complaint           weakness x 1 week. on last day of cycle today. HX: anemia  .      History of Present Illness   The patient presents with weakness.  The onset was 2  days ago.  The course/duration of symptoms is constant.  The character of symptoms is generalized.  The degree at present is moderate.  Risk factors consist of none.  Prior episodes: occasional.  Therapy today: see nurses notes.  Associated symptoms: denies chest pain, denies abdominal pain, denies nausea, denies vomiting, denies shortness of breath, denies fever and denies chills.  Additional history: Reports having regular cycles approximately 1x/month lasting 5 days. Denies heavy bleeding.        Review of Systems   Constitutional symptoms:  Weakness, no fever, no chills.    Skin symptoms:  No rash,    Eye symptoms:  Vision unchanged.   ENMT symptoms:  No nasal congestion,    Respiratory symptoms:  No shortness of breath,    Cardiovascular symptoms:  No chest pain,    Gastrointestinal symptoms:  No abdominal pain,    Genitourinary symptoms:  No dysuria,    Musculoskeletal symptoms:  No back pain,    Neurologic symptoms:  No headache,    Psychiatric symptoms:  No substance abuse,    Endocrine symptoms:  No polyuria,    Allergy/immunologic symptoms:  No recurrent infections,       Health Status   Allergies: No known allergies.   Medications: Per nurse's notes.      Past Medical/ Family/ Social History   Medical history:    Active  Anxiety ([id number]).    Surgical history:     delivery ([id number]) in  at 27 Years.  csect x1.  denies weight loss surgery..   Social history:    Social & Psychosocial Habits    Alcohol  2014 Risk Assessment: Denies Alcohol Use    2015  Use: Current    Type: Wine    Frequency: 1-2 times per week    Employment/School  2017  Status: Employed    Exercise  2017  Times per week: Daily    Exercise type: GYM, Walking    Home/Environment  2017  Lives with: Alone    Alcohol abuse in household: No    Substance abuse in household: No    Smoker in household: No    Injuries/Abuse/Neglect in household: No    Feels unsafe at home: No    Safe place to go: Yes    Family/Friends available to help: Yes    Nutrition/Health  2017  Type of diet: Regular    Sexual    Comment: CONFIDENTIAL - 2017 10:51 - Des HEREDIA Malika [facility]iv[facility]Trinity    Substance Abuse  2014 Risk Assessment: Denies Substance Abuse    2017  Use: Current    Type: Marijuana    Tobacco  2014 Risk Assessment: Denies Tobacco Use    2015  Use: Never smoker  .      Physical Examination               Vital Signs      Vital Signs (last 24 hrs)_____  Last Charted___________  Temp Oral     36.9 DegC  (DEC 29 22:00)  Heart Rate Peripheral   79 bpm  (DEC 29 22:)  Resp Rate         16 br/min  (DEC 29 22:)  SBP      138 mmHg  (DEC 29 22:)  DBP      88 mmHg  (DEC 29 22:)  SpO2      100 %  (DEC 29 22:)  Weight      68.15 kg  (DEC 29 22:)  Height      164 cm  (DEC 29 22:)  BMI      25.34  (DEC 29 22:)  .   General:  Alert, no acute distress.    Skin:  Warm, dry, intact, no rash.    Head:  Normocephalic, atraumatic.    Neck:  Supple, trachea midline.    Eye:  Extraocular movements are intact, Conjunctiva: Pale.    Ears, nose, mouth and throat:  Oral mucosa moist.   Cardiovascular:  Regular rate and rhythm.   Respiratory:  Lungs are clear to auscultation, respirations are non-labored, breath sounds are equal, Symmetrical  chest wall expansion.    Chest wall:  No tenderness.   Musculoskeletal:  Normal ROM, normal strength.    Gastrointestinal:  Soft, Nontender, Non distended, Normal bowel sounds, No organomegaly, Rectal exam: Exam deferred.    Genitourinary:  Exam deferred.   Neurological:  Alert and oriented to person, place, time, and situation, No focal neurological deficit observed, normal speech observed.    Psychiatric:  Cooperative, appropriate mood & affect.       Medical Decision Making   Documents reviewed:  Emergency department nurses' notes, prior records.    Orders  Launch Order Profile (Selected)   Inpatient Orders  Ordered  Transfusion Order RBC auto: 12/29/17 23:02:00 CST, Stat collect, Blood, Lab Collect, Packed RBC, To Give, 2, 12/29/17, 12/29/17 23:02:00 CST  Type and Screen for Transfusion [facility]: 12/29/17 23:02:00 CST, Stat collect, Blood, Lab Collect, Packed RBC, To Give, 2, 12/29/17, 12/29/17 23:02:00 CST.   Results review:  Lab results : Lab View   12/29/2017 22:51 CST     U beta hCG Ql POC         Negative    12/29/2017 22:10 CST     Sodium Lvl                144 mmol/L                             Potassium Lvl             3.4 mmol/L  LOW                             Chloride                  110 mmol/L  HI                             CO2                       27 mmol/L                             Calcium Lvl               8.3 mg/dL  LOW                             Glucose Lvl               100 mg/dL                             BUN                       12 mg/dL                             Creatinine                0.70 mg/dL                             eGFR-AA                   >105 mL/min                             eGFR-AMINA                  96 mL/min                             Bili Total                0.2 mg/dL                             Bili Direct               <0.1 mg/dL                             Bili Indirect             See comment mg/dL  (Modified)                            AST                        21 unit/L                             ALT                       25 unit/L                             Alk Phos                  104 unit/L                             Total Protein             7.6 gm/dL                             Albumin Lvl               3.4 gm/dL                             Globulin                  4.20 gm/mL  HI                             A/G Ratio                 1 ratio                             WBC                       8.1 x10(3)/mcL                             RBC                       4.13 x10(6)/mcL                             Hgb                       7.2 gm/dL  LOW                             Hct                       26.0 %  LOW                             Platelet                  374 x10(3)/mcL                             MCV                       63.0 fL  LOW                             MCH                       17.4 pg  LOW                             MCHC                      27.7 gm/dL  LOW                             RDW                       22.2 %  HI                             MPV                       ---- fL                             Abs Neut                  4.17 x10(3)/mcL                             Neutro Auto               52 x10(3)/mcL  NA                             Lymph Auto                36 %                             Mono Auto                 9 %                             Eos Auto                  2 %                             Abs Eos                   0.15 x10(3)/mcL  NA                             Basophil Auto             1 %                             Abs Neutro                4.17 x10(3)/mcL  NA                             Abs Lymph                 2.95 x10(3)/mcL  NA                             Abs Mono                  0.73 x10(3)/mcL  NA                             Abs Baso                  0.06 x10(3)/mcL  NA                             IG%                       0 %  NA                             IG#                       0.0300  NA                              Hypochrom                 2+                             Platelet Est              Increased                             Anisocyte                 2+                             Poik                      1+                             Microcyte                 2+                             Macrocyte                 1+                             Polychrom                 1+                             RBC Morph                 Abnormal                             Tear drop cell            1+                             Ovalocytes                1+  .      Reexamination/ Reevaluation   Time: 12/29/2017 23:00:00 .   Course: unchanged.   Pain status: unchanged.   Assessment: exam unchanged.      Impression and Plan   Diagnosis   Symptomatic anemia ([id number] D64.9)      Calls-Consults   -  12/29/2017 23:08:00 , Beni ALSTON, DELTA [facility] On-Call, consult, Will come evaluate patient in ER.    Plan   Counseled: Patient, Family, Regarding diagnosis, Regarding diagnostic results, Regarding treatment plan, Patient indicated understanding of instructions.

## 2022-04-30 NOTE — DISCHARGE SUMMARY
Patient:   Sandra Vega             MRN: [id number]            FIN: [id number]               Age:   47 years     Sex:  Female     :  1970   Associated Diagnoses:   Weakness or fatigue; Symptomatic anemia   Author:   Lida Mckenzie MD      Discharge Information      Discharge Summary Information   Admitted  2017   Discharged  2017   Admitting physician     Calista ALSTON, Santos Martinez.     Discharge diagnosis     Weakness or fatigue (PNED [id number]).     Symptomatic anemia ([id number]-SA D64.9).     Discharge medications     OTHER MEDICATIONS (Selected)   Prescriptions  Prescribed  ferrous gluconate 324 mg oral tablet: 324 mg = 1 tab(s), Oral, BID, # 60 tab(s), 0 Refill(s), Pharmacy: [facility]/pharmacy #[id number]  Documented Medications  Documented  hydrOXYzine pamoate 25 mg oral capsule: 25 mg = 1 cap(s), Oral, QID, PRN PRN for anxiety, 0 Refill(s).        Physical Examination   Please see progress note from 2017      Hospital Course   Hospital Course   Admitted from: from emergency department.     Weakness  Symptomatic anemia  History of microcytic anemia  History of fibroids  History of Prediabetes  History of Anxiety  History of Hyperlipidemia    47-year-old female admitted for weakness- patient with symptomatic anemia secondary to menstrual bleeding. H-H on admission: 7.2/ 26.0; transfused 2UPRBCs overnight. Patient has microcytic anemia; MCV 63.0; ordering iron level, TIBC, ferritin level and reticulocyte count- iron studies can be followed outpatient with PCP. High suspicion for GEOVANY, will start patient on ferrous gluconate 325mg BID.  Ordered pelvic ultrasound and referring to GYN for further evaluation of uterine fibroids; patient to follow up in GYN clinic.  Electrolytes were repleted as necessary. Comorbid medical conditions to be followed outpatient with PCP upon discharge.  Oxygenation was maintained on room air throughout hospital stay.  No acute events during  hospital stay.  Anticoagulation was held secondary to symptomatic anemia.  H&H was repeated next morning, responded appropriately to blood transfusion10.3/35.3.  Patient stated she felt more energized, with resolution of weakness.  Clinically stable for discharge home with follow-up in IM in GYN clinic in 1-2 weeks.    Medications reconciled, refills provided and explained to patient.  Adding ferrous gluconate 325 mg p.od for microcytic anemia.   Activity as tolerated.  He should counseled dietary modifications and adherence to low cholesterol diet for history of hyperlipidemia.  Total time spent on discharge is greater than 31 minutes.         Discharge Plan   Discharge Summary Plan   Discharge Status: improved.     Discharge instructions given: to patient.     Discharge disposition: discharge to home.     Prescriptions: continue same medications, reviewed with patient, written and given to patient.     Education and Follow-up   Counseled: patient.     Discharge Planning: Iron Deficiency Anemia, Adult, Blood Transfusion, Easy-to-Read, f/u with Gyn in 1-2 weeks; f/u with PCP in 1-2 weeks; Anytime the conditions worsen, return to clinic or go to ED.

## 2022-05-03 NOTE — HISTORICAL [facility] CERNER
This is a historical note converted from [employer]. Formatting and pictures may have been removed.  Please reference [employer] for original formatting and attached multimedia. Chief Complaint  weakness x 1 week. on last day of cycle today. HX: anemia  History of Present Illness  47 y.o F with PMHx of prediabetes, anxiety and HLD presents to the ED with chief complaint of generalized weakness of 2 days duration. ?Denies headache, visual changes, cough, shortness of breath, chest pain, palpitations, nausea, vomiting, abdominal pain, diarrhea, constipation, hematemesis, melena, hematochezia, fever and chills. ?She states that she occasionally gets heartburn, has never had a?EGD in the past or colonoscopy.? She was evaluated by GYN?a few years ago and was told that she has fibroids but she did not follow-up. ?No previous episodes of similar events. ?Patient states that she has?regular?28 day cycles that last 5 days in duration,?heavy flow in the 1st 3 days- denies presence of any clots. ?This is her baseline menstrual history. ?She denies the presence of any ecchymosis,?easy bruising and spontaneous bleeding. ?She states that she feels weak?and dizzy with exertion. ?Denies any gait instability, falls, trauma. ?She has not received any blood transfusions in the past and does not have any allergies that she is aware of.  Social hx: non-smoker; drinks socially, no illicit drug use.  FHx: none  SHx:  x2  All: NKDA  ?  Review of Systems  Constitutional : No Fever, No chills, No sweats, +weakness or?fatigue.  Skin : No jaundice, no rash, no pruritus.  HEENT : No visual changes, no blurred vision, No sore throat, no nasal congestion.  Respiratory : No?Shortness of breath, no orthopnea, no cough, no hemoptysis.  Cardiovascular : No chest pain, no palpitations, no syncope, no peripheral edema.  Gastrointestinal : No Abdominal pain,??No nausea,?No vomiting, No diarrhea, no blood in stools  Genitourinary? No dysuria, No  frequency, no hematuria?or urgency.  Endocrine: No polyuria, no polydipsia, no heat or cold intolerance.  Hematologic/lymphatic: No bruising/bleeding tendency.  Musculoskeletal : No Joint pain, No muscle pain, No claudication.  Neurologic : No headache, +dizziness, No focal weakness, no tingling or numbness.  Psychiatric : No anxiety, no depression.  Physical Exam  Vitals & Measurements  T:?36.9? ?C ?(Oral)? HR:?79?(Peripheral)? RR:?16? BP:?138/88? SpO2:?100%? WT:?68.15?kg? WT:?68.15?kg?  Gen: NAD, resting comfortably  HEENT: NCAT, PERRL, EOMI, sclera anicteric, moist mucous membranes, no neck masses, no LAD  CVS: S1, S2, Regular, no murmurs or gallops, normal peripheral perfusion, pedal pulses 2+, no edema  Resp: CTAB, no crackles, no wheezing, normal respiratory effort  Abd: Soft, NT, ND. No masses  MSK: NROM, no deformity  Skin: warm, no rashes, no pallor, no ecchymosis  Neuro: AAO x3, CN II-XII grossly intact, no focal deficits, strength 4/5 upper and lower extremities bilaterally.  ?  ?  Labs Last 24 Hours?  ?Chemistry Hematology/Coagulation   Sodium Lvl: 144 mmol/L (12/29/17 22:45:00) WBC: 8.1 x10(3)/mcL (12/29/17 22:35:10)   Potassium Lvl:?3.4 mmol/L?Low (12/29/17 22:45:00) RBC: 4.13 x10(6)/mcL (12/29/17 22:35:10)   Chloride:?110 mmol/L?High (12/29/17 22:45:00) Hgb:?7.2 gm/dL?Low (12/29/17 22:35:10)   CO2: 27 mmol/L (12/29/17 22:45:00) Hct:?26 %?Low (12/29/17 22:35:10)   Calcium Lvl:?8.3 mg/dL?Low (12/29/17 22:45:00) Platelet: 374 x10(3)/mcL (12/29/17 22:35:10)   Glucose Lvl: 100 mg/dL (12/29/17 22:45:00) MCV:?63 fL?Low (12/29/17 22:35:10)   BUN: 12 mg/dL (12/29/17 22:45:00) MCH:?17.4 pg?Low (12/29/17 22:35:10)   Creatinine: 0.7 mg/dL (12/29/17 22:45:00) MCHC:?27.7 gm/dL?Low (12/29/17 22:35:10)   eGFR-AA: >105 (12/29/17 22:45:01) RDW:?22.2 %?High (12/29/17 22:35:10)   eGFR-AMINA: 96 mL/min (12/29/17 22:45:02) MPV: ---- (12/29/17 22:35:10)   Bili Total: 0.2 mg/dL (12/29/17 22:45:00) Abs Neut: 4.17 x10(3)/mcL  (12/29/17 22:35:10)   Bili Direct: <0.1 (12/29/17 22:45:00) Neutro Auto: 52 x10(3)/mcL (12/29/17 22:35:12)   Bili Indirect: See comment (12/29/17 22:47:00) Lymph Auto: 36 % (12/29/17 22:35:12)   AST: 21 unit/L (12/29/17 22:45:00) Mono Auto: 9 % (12/29/17 22:35:12)   ALT: 25 unit/L (12/29/17 22:45:00) Eos Auto: 2 % (12/29/17 22:35:12)   Alk Phos: 104 unit/L (12/29/17 22:46:23) Abs Eos: 0.15 x10(3)/mcL (12/29/17 22:35:12)   Total Protein: 7.6 gm/dL (12/29/17 22:45:00) Basophil Auto: 1 % (12/29/17 22:35:12)   Albumin Lvl: 3.4 gm/dL (12/29/17 22:45:00) Abs Neutro: 4.17 x10(3)/mcL (12/29/17 22:35:12)   Globulin:?4.2 gm/mL?High (12/29/17 22:45:00) Abs Lymph: 2.95 x10(3)/mcL (12/29/17 22:35:12)   A/G Ratio: 1 ratio (12/29/17 22:45:00) Abs Mono: 0.73 x10(3)/mcL (12/29/17 22:35:12)    Abs Baso: 0.06 x10(3)/mcL (12/29/17 22:35:12)    IG%: 0 % (12/29/17 22:35:12)    IG#: 0.03 (12/29/17 22:35:12)    Hypochrom: 2+ (12/29/17 22:57:38)    Platelet Est: Increased (12/29/17 22:57:38)    Anisocyte: 2+ (12/29/17 22:57:38)    Poik: 1+ (12/29/17 22:57:38)    Microcyte: 2+ (12/29/17 22:57:38)    Macrocyte: 1+ (12/29/17 22:57:38)    Polychrom: 1+ (12/29/17 22:57:38)    RBC Morph: Abnormal (12/29/17 22:57:38)    Tear drop cell: 1+ (12/29/17 22:57:38)    Ovalocytes: 1+ (12/29/17 22:57:38)   ?  ?  ?  Radiology: no new imaging studies  Assessment/Plan  Weakness  Symptomatic anemia  History of microcytic anemia  History of fibroids  History of Prediabetes  History of Anxiety  History of Hyperlipidemia  ?  47-year-old female admitted for weakness- patient with symptomatic anemia secondary to menstrual bleeding. H-H on admission: 7.2/ 26.0; will transfuse 2UPRBCs now. Patient has microcytic anemia; MCV 63.0; ordering iron level, TIBC, ferritin level and reticulocyte count. ?High suspicion for GEOVANY, will start patient on ferrous gluconate 325mg BID. ?Ordering FOBT??1, will start patient on Protonix 40 mg for GI prophylaxis, ?given potential for  acute GI bleed.?Will?consult GI for possible EGD/ colonoscopy if FOBT is positive.?Ordering pelvic ultrasound and referring to GYN for further evaluation of uterine fibroids. Repleting electrolytes as necessary.?Comorbid medical conditions to be followed outpatient with PCP upon discharge. Patient can likely be discharged home next morning with clinical improvement; will repeat CBC in AM.?  ?  Diet: Diabetic  DVT ppx: SCDs, avoid oral anticoagulation  GI ppx: Protonix 40mg  Fluids: none  Abx: none  Oxygenation: room air; to keep O2 sats >94%  ?   Problem List/Past Medical History  Ongoing  Anxiety  Historical  Procedure/Surgical History   delivery ()  csect x1  denies weight loss surgery  Medications  Inpatient  acetaminophen, 1000 mg= 2 tab(s), Oral, q6hr, PRN  DuoNeb 0.5 mg-2.5 mg/3 mL inhalation solution, 3 mL, NEB, q6hr Resp  ferrous gluconate 324 mg oral tablet, 324 mg= 1 tab(s), Oral, BID  hydrOXYzine pamoate 25 mg oral capsule, 25 mg= 1 cap(s), Oral, QID, PRN  ibuprofen, 400 mg= 1 tab(s), Oral, q6hr, PRN  K-Dur 20 oral tablet, extended release, 20 mEq= 1 tab(s), Oral, BID  ketorolac oral tab, 10 mg= 1 tab(s), Oral, Once  labetalol, 20 mg= 4 mL, IV Push, q2hr, PRN  Phenergan, 12.5 mg= 0.5 mL, IV Push, q4hr, PRN  Protonix, 40 mg= 1 tab(s), Oral, Daily  Zofran, 4 mg= 2 mL, IV Push, q4hr, PRN  Home  hydrOXYzine pamoate 25 mg oral capsule, 25 mg= 1 cap(s), Oral, QID, PRN  Allergies  No Known Allergies  Social History  Alcohol - Denies Alcohol Use, 2017  Current, Wine, 1-2 times per week  Employment/School - 2017  Employed  Exercise - 2017  Exercise frequency: Daily. Exercise type: Walking, GYM.  Home/Environment - 2017  Lives with Alone. Alcohol abuse in household: No. Substance abuse in household: No. Smoker in household: No. Injuries/Abuse/Neglect in household: No. Feels unsafe at home: No. Safe place to go: Yes. Family/Friends available for support: Yes.  Nutrition/Health -  11/14/2017  Regular  Sexual - 11/14/2017  Substance Abuse - Denies Substance Abuse, 11/14/2017  Current, Marijuana  Tobacco - Denies Tobacco Use, 11/14/2017  Never smoker  Family History  Arthritis: Mother.      ?  HO II RAN: I have seen and examined this patient with HO I. ?I have reviewed medical records.??I agree with the assessment and plan above.? Patient?admitted for symptomatic anemia secondary to?menorrhagia.? Initial H&H 7.2 and 26.0?respectively.? Plan to transfuse 2 units of PRBCs.? Patient will likely need outpatient workup?by gynecology?for a AUB.  ?   Patient transfused 2U PRBCs- H-H responded appropriately 10.3/35.3. Clinically stable for discharge home. Patient to follow-up with Gyn and IM in 1-2 weeks. Prescribing ferrous gluconate 325mg daily for microcytic anemia; iron studies pending; can be followed outpatient.   I was present with the resident during the history and physical examination.  ???  [x ] I discussed the case with the resident and agree with the findings and plan as documented in the residents note.  [ ] I discussed the case with the resident and agree with the findings and plan as documented in the residents note except:  ?   46 yo female admitted with symptomatic anemia secondary to menorrhagia from uterine fibroids.? Will transfuse and ensure responds appropriately.? Plan for pelvic US as well as outpatient GYN followup  ?   MD Denisse  ID Staff

## 2022-05-06 RX ORDER — ALBUTEROL SULFATE 90 UG/1
AEROSOL, METERED RESPIRATORY (INHALATION)
Qty: 8.5 G | Refills: 3
Start: 2022-05-06 | End: 2023-06-06 | Stop reason: SDUPTHER

## 2022-05-31 DIAGNOSIS — Z01.419 WELL WOMAN EXAM WITH ROUTINE GYNECOLOGICAL EXAM: Primary | ICD-10-CM

## 2022-06-08 DIAGNOSIS — E11.9 CONTROLLED TYPE 2 DIABETES MELLITUS WITHOUT COMPLICATION, WITHOUT LONG-TERM CURRENT USE OF INSULIN: Primary | ICD-10-CM

## 2022-07-06 ENCOUNTER — HOSPITAL ENCOUNTER (EMERGENCY)
Facility: HOSPITAL | Age: 52
Discharge: HOME | End: 2022-07-06
Attending: EMERGENCY MEDICINE
Payer: MEDICAID

## 2022-07-06 VITALS
HEIGHT: 60 IN | BODY MASS INDEX: 29.24 KG/M2 | HEART RATE: 70 BPM | SYSTOLIC BLOOD PRESSURE: 133 MMHG | TEMPERATURE: 99 F | WEIGHT: 148.94 LBS | OXYGEN SATURATION: 100 % | RESPIRATION RATE: 19 BRPM | DIASTOLIC BLOOD PRESSURE: 86 MMHG

## 2022-07-06 DIAGNOSIS — R07.9 CHEST PAIN: ICD-10-CM

## 2022-07-06 DIAGNOSIS — R07.89 ATYPICAL CHEST PAIN: Primary | ICD-10-CM

## 2022-07-06 DIAGNOSIS — F41.9 ANXIETY: ICD-10-CM

## 2022-07-06 LAB
ALBUMIN SERPL-MCNC: 3.5 GM/DL (ref 3.5–5)
ALBUMIN/GLOB SERPL: 1 RATIO (ref 1.1–2)
ALP SERPL-CCNC: 76 UNIT/L (ref 40–150)
ALT SERPL-CCNC: 20 UNIT/L (ref 0–55)
AST SERPL-CCNC: 23 UNIT/L (ref 5–34)
BASOPHILS # BLD AUTO: 0.05 X10(3)/MCL (ref 0–0.2)
BASOPHILS NFR BLD AUTO: 0.8 %
BILIRUBIN DIRECT+TOT PNL SERPL-MCNC: 0.2 MG/DL
BNP BLD-MCNC: <10 PG/ML
BUN SERPL-MCNC: 9.8 MG/DL (ref 9.8–20.1)
CALCIUM SERPL-MCNC: 8.9 MG/DL (ref 8.4–10.2)
CHLORIDE SERPL-SCNC: 106 MMOL/L (ref 98–107)
CK MB SERPL-MCNC: 4.1 NG/ML
CK SERPL-CCNC: 493 U/L (ref 29–168)
CO2 SERPL-SCNC: 25 MMOL/L (ref 22–29)
CREAT SERPL-MCNC: 0.72 MG/DL (ref 0.55–1.02)
D DIMER PPP IA.FEU-MCNC: 1.13 UG/ML FEU (ref 0–0.5)
EOSINOPHIL # BLD AUTO: 0.12 X10(3)/MCL (ref 0–0.9)
EOSINOPHIL NFR BLD AUTO: 1.8 %
ERYTHROCYTE [DISTWIDTH] IN BLOOD BY AUTOMATED COUNT: 21.4 % (ref 11.5–17)
GLOBULIN SER-MCNC: 3.5 GM/DL (ref 2.4–3.5)
GLUCOSE SERPL-MCNC: 107 MG/DL (ref 74–100)
HCT VFR BLD AUTO: 33.6 % (ref 37–47)
HGB BLD-MCNC: 9.5 GM/DL (ref 12–16)
IMM GRANULOCYTES # BLD AUTO: 0.03 X10(3)/MCL (ref 0–0.04)
IMM GRANULOCYTES NFR BLD AUTO: 0.5 %
LYMPHOCYTES # BLD AUTO: 2.29 X10(3)/MCL (ref 0.6–4.6)
LYMPHOCYTES NFR BLD AUTO: 35.2 %
MCH RBC QN AUTO: 21.5 PG (ref 27–31)
MCHC RBC AUTO-ENTMCNC: 28.3 MG/DL (ref 33–36)
MCV RBC AUTO: 76 FL (ref 80–94)
MONOCYTES # BLD AUTO: 0.71 X10(3)/MCL (ref 0.1–1.3)
MONOCYTES NFR BLD AUTO: 10.9 %
NEUTROPHILS # BLD AUTO: 3.3 X10(3)/MCL (ref 2.1–9.2)
NEUTROPHILS NFR BLD AUTO: 50.8 %
NRBC BLD AUTO-RTO: 0 %
PLATELET # BLD AUTO: 236 X10(3)/MCL (ref 130–400)
PLATELETS.RETICULATED NFR BLD AUTO: 7 % (ref 0.9–11.2)
PMV BLD AUTO: 0 FL (ref 7.4–10.4)
POTASSIUM SERPL-SCNC: 3.8 MMOL/L (ref 3.5–5.1)
PROT SERPL-MCNC: 7 GM/DL (ref 6.4–8.3)
RBC # BLD AUTO: 4.42 X10(6)/MCL (ref 4.2–5.4)
SODIUM SERPL-SCNC: 141 MMOL/L (ref 136–145)
TROPONIN I SERPL-MCNC: <0.01 NG/ML (ref 0–0.04)
WBC # SPEC AUTO: 6.5 X10(3)/MCL (ref 4.5–11.5)

## 2022-07-06 PROCEDURE — 84484 ASSAY OF TROPONIN QUANT: CPT | Performed by: PHYSICIAN ASSISTANT

## 2022-07-06 PROCEDURE — 83880 ASSAY OF NATRIURETIC PEPTIDE: CPT | Performed by: PHYSICIAN ASSISTANT

## 2022-07-06 PROCEDURE — 99285 EMERGENCY DEPT VISIT HI MDM: CPT | Mod: 25

## 2022-07-06 PROCEDURE — 82553 CREATINE MB FRACTION: CPT | Performed by: PHYSICIAN ASSISTANT

## 2022-07-06 PROCEDURE — 85025 COMPLETE CBC W/AUTO DIFF WBC: CPT | Performed by: PHYSICIAN ASSISTANT

## 2022-07-06 PROCEDURE — 25500020 PHARM REV CODE 255

## 2022-07-06 PROCEDURE — 85379 FIBRIN DEGRADATION QUANT: CPT | Performed by: PHYSICIAN ASSISTANT

## 2022-07-06 PROCEDURE — 36415 COLL VENOUS BLD VENIPUNCTURE: CPT | Performed by: PHYSICIAN ASSISTANT

## 2022-07-06 PROCEDURE — 82550 ASSAY OF CK (CPK): CPT | Performed by: PHYSICIAN ASSISTANT

## 2022-07-06 PROCEDURE — 80053 COMPREHEN METABOLIC PANEL: CPT | Performed by: PHYSICIAN ASSISTANT

## 2022-07-06 PROCEDURE — 93005 ELECTROCARDIOGRAM TRACING: CPT

## 2022-07-06 PROCEDURE — 25000003 PHARM REV CODE 250: Performed by: PHYSICIAN ASSISTANT

## 2022-07-06 RX ORDER — PANTOPRAZOLE SODIUM 40 MG/1
40 TABLET, DELAYED RELEASE ORAL DAILY
COMMUNITY
End: 2022-09-02 | Stop reason: SDUPTHER

## 2022-07-06 RX ORDER — HYDROXYZINE PAMOATE 25 MG/1
25 CAPSULE ORAL 4 TIMES DAILY
Qty: 20 CAPSULE | Refills: 0 | Status: SHIPPED | OUTPATIENT
Start: 2022-07-06 | End: 2022-07-11

## 2022-07-06 RX ORDER — HYDROXYZINE PAMOATE 25 MG/1
25 CAPSULE ORAL
Status: COMPLETED | OUTPATIENT
Start: 2022-07-06 | End: 2022-07-06

## 2022-07-06 RX ORDER — METFORMIN HYDROCHLORIDE 500 MG/1
500 TABLET ORAL 2 TIMES DAILY WITH MEALS
COMMUNITY
End: 2022-09-02 | Stop reason: SDUPTHER

## 2022-07-06 RX ORDER — FERROUS GLUCONATE 324(38)MG
324 TABLET ORAL
COMMUNITY
End: 2022-09-02 | Stop reason: SDUPTHER

## 2022-07-06 RX ORDER — HYDROXYZINE HYDROCHLORIDE 25 MG/1
25 TABLET, FILM COATED ORAL 3 TIMES DAILY
COMMUNITY
End: 2023-07-13 | Stop reason: SDUPTHER

## 2022-07-06 RX ORDER — NAPROXEN SODIUM 220 MG/1
81 TABLET, FILM COATED ORAL DAILY
COMMUNITY

## 2022-07-06 RX ADMIN — HYDROXYZINE PAMOATE 25 MG: 25 CAPSULE ORAL at 07:07

## 2022-07-06 RX ADMIN — IOPAMIDOL 100 ML: 755 INJECTION, SOLUTION INTRAVENOUS at 07:07

## 2022-07-06 NOTE — Clinical Note
"Sandra Floresnazanin Vega was seen and treated in our emergency department on 7/6/2022.  She may return to work on 07/08/2022.       If you have any questions or concerns, please don't hesitate to call.      Jesus Toribio RN    "

## 2022-07-06 NOTE — Clinical Note
"Sandra Floresnazanin Vega was seen and treated in our emergency department on 7/6/2022.  She may return to work on 07/07/2022.       If you have any questions or concerns, please don't hesitate to call.      Jesus Toribio RN    "

## 2022-07-07 NOTE — ED PROVIDER NOTES
Encounter Date: 2022       History     Chief Complaint   Patient presents with    Chest Pain     Pt in with complaints of left sided chest pain with SOB that started yesterday.     Sandra Vega is a 52 y.o. female who presents to the ED with complaints of chest pain and shortness of breath that started 1 day(s) ago. She reports a history of anxiety. She states she has been dealing with a lot of stress as her mother recently passed away. She denies SI and HI.        The history is provided by the patient. No  was used.     Review of patient's allergies indicates:  No Known Allergies  Past Medical History:   Diagnosis Date    Hypertension      Past Surgical History:   Procedure Laterality Date     SECTION       No family history on file.  Social History     Tobacco Use    Smoking status: Never Smoker    Smokeless tobacco: Never Used     Review of Systems   Constitutional: Negative for chills, fatigue and fever.   HENT: Negative for congestion, ear pain, sinus pain and sore throat.    Eyes: Negative for pain.   Respiratory: Positive for shortness of breath. Negative for cough and chest tightness.    Cardiovascular: Positive for chest pain. Negative for palpitations and leg swelling.   Gastrointestinal: Negative for abdominal pain, constipation, diarrhea, nausea and vomiting.   Genitourinary: Negative for dysuria.   Musculoskeletal: Negative for back pain and joint swelling.   Skin: Negative for color change and rash.   Neurological: Negative for dizziness and weakness.   Psychiatric/Behavioral: Negative for behavioral problems and confusion.       Physical Exam     Initial Vitals [22 1717]   BP Pulse Resp Temp SpO2   130/81 74 14 98.6 °F (37 °C) 98 %      MAP       --         Physical Exam    Constitutional: She appears well-developed and well-nourished.   HENT:   Head: Normocephalic and atraumatic.   Nose: Nose normal.   Eyes: EOM are normal. Pupils are equal, round, and  reactive to light.   Neck: Neck supple. No thyromegaly present. No JVD present.   Normal range of motion.  Cardiovascular: Normal rate, regular rhythm, normal heart sounds and intact distal pulses.   No murmur heard.  Pulmonary/Chest: Breath sounds normal. No respiratory distress. She has no wheezes. She has no rhonchi. She has no rales. She exhibits no tenderness.   Abdominal: Abdomen is soft. Bowel sounds are normal. She exhibits no distension. There is no abdominal tenderness. There is no rebound and no guarding.   Musculoskeletal:         General: No tenderness or edema. Normal range of motion.      Cervical back: Normal range of motion and neck supple.     Lymphadenopathy:     She has no cervical adenopathy.   Neurological: She is alert and oriented to person, place, and time.   Skin: Skin is warm and dry. Capillary refill takes less than 2 seconds.   Psychiatric: She has a normal mood and affect. Thought content normal.         ED Course   Procedures  Labs Reviewed   COMPREHENSIVE METABOLIC PANEL - Abnormal; Notable for the following components:       Result Value    Glucose Level 107 (*)     Albumin/Globulin Ratio 1.0 (*)     All other components within normal limits   CK - Abnormal; Notable for the following components:    Creatine Kinase 493 (*)     All other components within normal limits   CK-MB - Abnormal; Notable for the following components:    Creatine Kinase MB 4.1 (*)     All other components within normal limits   D DIMER, QUANTITATIVE - Abnormal; Notable for the following components:    D-Dimer 1.13 (*)     All other components within normal limits   CBC WITH DIFFERENTIAL - Abnormal; Notable for the following components:    Hgb 9.5 (*)     Hct 33.6 (*)     MCV 76.0 (*)     MCH 21.5 (*)     MCHC 28.3 (*)     RDW 21.4 (*)     MPV 0.0 (*)     All other components within normal limits    Narrative:     MPV N/A   TROPONIN I - Normal   B-TYPE NATRIURETIC PEPTIDE - Normal   CBC W/ AUTO DIFFERENTIAL     Narrative:     The following orders were created for panel order CBC auto differential.  Procedure                               Abnormality         Status                     ---------                               -----------         ------                     CBC with Differential[[id number]]        Abnormal            Final result                 Please view results for these tests on the individual orders.   EXTRA TUBES    Narrative:     The following orders were created for panel order EXTRA TUBES.  Procedure                               Abnormality         Status                     ---------                               -----------         ------                     Gold Top Hold[[id number]]                                                                 Please view results for these tests on the individual orders.   GOLD TOP HOLD        ECG Results          EKG 12-lead (In process)  Result time 07/06/22 17:41:39    In process by Interface, Lab In [facility] (07/06/22 17:41:39)                 Narrative:    Test Reason : R07.9,    Vent. Rate : 073 BPM     Atrial Rate : 073 BPM     P-R Int : 156 ms          QRS Dur : 072 ms      QT Int : 358 ms       P-R-T Axes : 010 -12 -05 degrees     QTc Int : 394 ms    Normal sinus rhythm  Voltage criteria for left ventricular hypertrophy  Nonspecific T wave abnormality  Abnormal ECG  No previous ECGs available    Referred By: [email or web address]   SELF           Confirmed By:                             Imaging Results          CTA Chest Non-Coronary (PE Study) (Final result)  Result time 07/06/22 20:34:10    Final result by Garret Garcia MD (07/06/22 20:34:10)                 Impression:      Incomplete studies.  The apices are not included.    A pulmonary embolus is not demonstrated      Electronically signed by: Garret Garcia MD  Date:    07/06/2022  Time:    20:34             Narrative:    EXAMINATION:  CTA CHEST NON CORONARY    CLINICAL HISTORY:  elevated d dimer, chest pain  with SOB;    TECHNIQUE:  Low dose axial images, sagittal and coronal reformations were obtained from the thoracic inlet to the lung bases following the IV administration of 100 mL of Isovue 370 contrast timing was optimized to evaluate the pulmonary arteries.  MIP images were performed.    Automatic exposure control (AEC) was utilized for dose reduction.    Dose: 153 mGycm    COMPARISON:  None    FINDINGS:  Mediastinum reveals no significant adenopathy.  There is a cyst in the left kidney.  There is mild prominence of the ascending aorta measuring 38 mm.    No infiltrates are seen.  The apices are not included in the exam.    There are no filling defects seen within the pulmonary arteries to indicate embolus.                               X-Ray Chest PA And Lateral (Final result)  Result time 07/06/22 17:53:10    Final result by Monique Vee MD (07/06/22 17:53:10)                 Impression:      Artifact from patient's hair partially obscures the apices.    In spite of this limitation:    No acute thoracic abnormality.    No significant interval change.      Electronically signed by: Monique Vee  Date:    07/06/2022  Time:    17:53             Narrative:    EXAMINATION:  XR CHEST PA AND LATERAL    CLINICAL HISTORY:  Chest Pain;    COMPARISON:  10/16/2020.    FINDINGS:  No focal consolidations, pleural effusions or pneumothoraces.    Cardiac silhouette top normal in size without overt decompensation.    No acute bony pathology.    Soft tissues within normal limits.                                 Medications   hydrOXYzine pamoate capsule 25 mg (25 mg Oral Given 7/6/22 1918)   iopamidoL (ISOVUE-370) 76 % injection (100 mLs  Given 7/6/22 1945)                 ED Course as of 07/06/22 2059 Wed Jul 06, 2022 2057 Reassessed patient att his time. She is laying comfortably in the exam bed. States her symptoms are much improved after Vistaril in the ED. Normal workup. Strict ED precautions given. Stable for  discharge.  [VJ]      ED Course User Index  [VJ] Jodee Varela PA-C             Clinical Impression:   Final diagnoses:  [R07.9] Chest pain  [R07.89] Atypical chest pain (Primary)  [F41.9] Anxiety          ED Disposition Condition    Discharge Stable        ED Prescriptions     Medication Sig Dispense Start Date End Date Auth. Provider    hydrOXYzine pamoate (VISTARIL) 25 MG Cap Take 1 capsule (25 mg total) by mouth 4 (four) times daily. for 5 days 20 capsule 7/6/2022 7/11/2022 Jodee Varela PA-C        Follow-up Information     Follow up With Specialties Details Why Contact Info    [facility] - Emergency Dept Emergency Medicine In 3 days As needed, If symptoms worsen [address] [facility] [address] [facility] [address] [phone]    [facility]  In 1 week  [address] [address]           Jodee Varela PA-C  07/06/22 2059

## 2022-07-15 ENCOUNTER — HOSPITAL ENCOUNTER (EMERGENCY)
Facility: HOSPITAL | Age: 52
Discharge: HOME | End: 2022-07-16
Attending: INTERNAL MEDICINE
Payer: MEDICAID

## 2022-07-15 DIAGNOSIS — N92.4 PERIMENOPAUSAL MENORRHAGIA: Primary | ICD-10-CM

## 2022-07-15 PROCEDURE — 99284 EMERGENCY DEPT VISIT MOD MDM: CPT | Mod: 25

## 2022-07-15 NOTE — Clinical Note
"Sandra"Oral Vega was seen and treated in our emergency department on 7/15/2022.  She may return to work on 07/17/2022.       If you have any questions or concerns, please don't hesitate to call.      Devin Caba, Patient Care Assistant"

## 2022-07-16 VITALS
SYSTOLIC BLOOD PRESSURE: 107 MMHG | RESPIRATION RATE: 18 BRPM | BODY MASS INDEX: 28.86 KG/M2 | DIASTOLIC BLOOD PRESSURE: 60 MMHG | WEIGHT: 147 LBS | TEMPERATURE: 98 F | HEART RATE: 82 BPM | HEIGHT: 60 IN | OXYGEN SATURATION: 99 %

## 2022-07-16 LAB
ANISOCYTOSIS BLD QL SMEAR: ABNORMAL
BASOPHILS # BLD AUTO: 0.05 X10(3)/MCL (ref 0–0.2)
BASOPHILS NFR BLD AUTO: 0.7 %
EOSINOPHIL # BLD AUTO: 0.14 X10(3)/MCL (ref 0–0.9)
EOSINOPHIL NFR BLD AUTO: 1.8 %
ERYTHROCYTE [DISTWIDTH] IN BLOOD BY AUTOMATED COUNT: 21.4 % (ref 11.5–17)
HCT VFR BLD AUTO: 28.8 % (ref 37–47)
HGB BLD-MCNC: 9 GM/DL (ref 12–16)
HYPOCHROMIA BLD QL SMEAR: ABNORMAL
IMM GRANULOCYTES # BLD AUTO: 0.04 X10(3)/MCL (ref 0–0.04)
IMM GRANULOCYTES NFR BLD AUTO: 0.5 %
LYMPHOCYTES # BLD AUTO: 2.29 X10(3)/MCL (ref 0.6–4.6)
LYMPHOCYTES NFR BLD AUTO: 30 %
MCH RBC QN AUTO: 23.3 PG (ref 27–31)
MCHC RBC AUTO-ENTMCNC: 31.3 MG/DL (ref 33–36)
MCV RBC AUTO: 74.6 FL (ref 80–94)
MICROCYTES BLD QL SMEAR: ABNORMAL
MONOCYTES # BLD AUTO: 0.66 X10(3)/MCL (ref 0.1–1.3)
MONOCYTES NFR BLD AUTO: 8.6 %
NEUTROPHILS # BLD AUTO: 4.5 X10(3)/MCL (ref 2.1–9.2)
NEUTROPHILS NFR BLD AUTO: 58.4 %
NRBC BLD AUTO-RTO: 0 %
OVALOCYTES (OLG): SLIGHT
PLATELET # BLD AUTO: 238 X10(3)/MCL (ref 130–400)
PLATELET # BLD EST: ADEQUATE 10*3/UL
PLATELETS.RETICULATED NFR BLD AUTO: 7.1 % (ref 0.9–11.2)
PMV BLD AUTO: ABNORMAL FL
POIKILOCYTOSIS BLD QL SMEAR: SLIGHT
RBC # BLD AUTO: 3.86 X10(6)/MCL (ref 4.2–5.4)
RBC MORPH BLD: ABNORMAL
WBC # SPEC AUTO: 7.6 X10(3)/MCL (ref 4.5–11.5)

## 2022-07-16 PROCEDURE — 36415 COLL VENOUS BLD VENIPUNCTURE: CPT | Performed by: INTERNAL MEDICINE

## 2022-07-16 PROCEDURE — 85025 COMPLETE CBC W/AUTO DIFF WBC: CPT | Performed by: INTERNAL MEDICINE

## 2022-07-16 RX ORDER — DICYCLOMINE HYDROCHLORIDE 10 MG/1
10 CAPSULE ORAL 3 TIMES DAILY PRN
Qty: 15 CAPSULE | Refills: 0 | Status: SHIPPED | OUTPATIENT
Start: 2022-07-16

## 2022-07-16 RX ORDER — FERROUS GLUCONATE 324(38)MG
324 TABLET ORAL
Qty: 30 TABLET | Refills: 2 | Status: SHIPPED | OUTPATIENT
Start: 2022-07-16 | End: 2022-09-02

## 2022-07-16 RX ORDER — KETOROLAC TROMETHAMINE 30 MG/ML
30 INJECTION, SOLUTION INTRAMUSCULAR; INTRAVENOUS
Status: DISCONTINUED | OUTPATIENT
Start: 2022-07-16 | End: 2022-07-16 | Stop reason: HOSPADM

## 2022-07-16 NOTE — ED PROVIDER NOTES
Source of History:  Patient, no limitations    Chief complaint:  Menorrhagia      HPI:  Sandra Vega is a 52 y.o. female presenting with Menorrhagia       53yo F still has menstrual cycles that have become more irregular as of last few months, complains of menorrhagia, 1 prior blood transfusion about a year ago  The patient complains vaginal bleeding described as heavier than period, using 7 pad(s) since onset. Onset of symptoms was abrupt starting 1 day ago. Severity of symptoms at onset was moderate and now is mild. Symptoms occur at rest  Symptoms have been constant. Symptoms are aggravated by nothing, alleviated by nothing and are associated with nothing.       Review of Systems   Constitutional symptoms:  Negative except as documented in HPI.   Skin symptoms:  Negative except as documented in HPI.   HEENT symptoms:  Negative except as documented in HPI.   Respiratory symptoms:  Negative except as documented in HPI.   Cardiovascular symptoms:  Negative except as documented in HPI.   Gastrointestinal symptoms:  Negative except as documented in HPI.    Genitourinary symptoms:  Negative except as documented in HPI.   Musculoskeletal symptoms:  Negative except as documented in HPI.   Neurologic symptoms:  Negative except as documented in HPI.   Psychiatric symptoms:  Negative except as documented in HPI.   Allergy/immunologic symptoms:  Negative except as documented in HPI.             Additional review of systems information: All other systems reviewed and otherwise negative.      Review of patient's allergies indicates:  No Known Allergies    PMH:  As per HPI and below:    Past Medical History:   Diagnosis Date    Hypertension         History reviewed. No pertinent family history.    Past Surgical History:   Procedure Laterality Date     SECTION         Social History     Tobacco Use    Smoking status: Never Smoker    Smokeless tobacco: Never Used   Substance Use Topics    Alcohol use: Not  Currently       There is no problem list on file for this patient.       Physical Exam:    /60   Pulse 82   Temp 98.3 °F (36.8 °C) (Oral)   Resp 18   Ht 5' (1.524 m)   Wt 66.7 kg (147 lb)   LMP 07/16/2022 (Exact Date)   SpO2 99%   BMI 28.71 kg/m²     Nursing note and vital signs reviewed.    General:  Alert, no acute distress.   Skin: Normal for Ethnic Origin, No cyanosis  HEENT: Normocephalic and atraumatic, Vision unchanged, Pupils symmetric, No icterus , Nasal mucosa is pink and moist  Cardiovascular:  Regular rate and rhythm, No edema  Chest Wall: No deformity, equal chest rise  Respiratory:  Lungs are clear to auscultation, respirations are non-labored.    Musculoskeletal:  No deformity, Normal perfusion to all extremities  Back: No bony tenderness  : No suprapubic pain, No CVA tenderness  Gastrointestinal:  Soft, Nontender, Non distended, Normal bowel sounds.    Neurological:  Alert and oriented to person, place, time, and situation, normal motor observed, normal speech observed.    Psychiatric:  Cooperative, appropriate mood & affect.        Labs that have been ordered have been independently reviewed and interpreted by myself.     Old Chart Reviewed.      Initial Impression/ Differential Dx:  Vaginal infection such as yeast infection, vaginitis, topical irritant, bacterial vaginosis, STD such as gonorrhea, chlamydia, UTI, discomfort of pregnancy, ectopic pregnancy, ovarian cysts, ovarian torsion, malignancy, constipation, colitis, diverticulitis      MDM:      Reviewed Nurses Note.    Reviewed Pertinent old records.    Orders Placed This Encounter    CBC Auto Differential    CBC with Differential    Blood Smear Microscopic Exam    dicyclomine (BENTYL) 10 MG capsule    ferrous gluconate (FERGON) 324 MG tablet                    Labs Reviewed   CBC WITH DIFFERENTIAL - Abnormal; Notable for the following components:       Result Value    RBC 3.86 (*)     Hgb 9.0 (*)     Hct 28.8 (*)     MCV  74.6 (*)     MCH 23.3 (*)     MCHC 31.3 (*)     RDW 21.4 (*)     All other components within normal limits   BLOOD SMEAR MICROSCOPIC EXAM (OLG) - Abnormal; Notable for the following components:    RBC Morph Abnormal (*)     Anisocyte 1+ (*)     Hypochrom 1+ (*)     Microcyte 2+ (*)     Ovalocytes Slight (*)     Poik Slight (*)     All other components within normal limits   CBC W/ AUTO DIFFERENTIAL    Narrative:     The following orders were created for panel order CBC Auto Differential.  Procedure                               Abnormality         Status                     ---------                               -----------         ------                     CBC with Differential[[id number]]        Abnormal            Final result                 Please view results for these tests on the individual orders.          No orders to display        Admission on 07/15/2022, Discharged on 07/16/2022   Component Date Value Ref Range Status    WBC 07/16/2022 7.6  4.5 - 11.5 x10(3)/mcL Final    RBC 07/16/2022 3.86 (A) 4.20 - 5.40 x10(6)/mcL Final    Hgb 07/16/2022 9.0 (A) 12.0 - 16.0 gm/dL Final    Hct 07/16/2022 28.8 (A) 37.0 - 47.0 % Final    MCV 07/16/2022 74.6 (A) 80.0 - 94.0 fL Final    MCH 07/16/2022 23.3 (A) 27.0 - 31.0 pg Final    MCHC 07/16/2022 31.3 (A) 33.0 - 36.0 mg/dL Final    RDW 07/16/2022 21.4 (A) 11.5 - 17.0 % Final    Platelet 07/16/2022 238  130 - 400 x10(3)/mcL Final    MPV 07/16/2022    Final    IPF 07/16/2022 7.1  0.9 - 11.2 % Final    Neut % 07/16/2022 58.4  % Final    Lymph % 07/16/2022 30.0  % Final    Mono % 07/16/2022 8.6  % Final    Eos % 07/16/2022 1.8  % Final    Basophil % 07/16/2022 0.7  % Final    Lymph # 07/16/2022 2.29  0.6 - 4.6 x10(3)/mcL Final    Neut # 07/16/2022 4.5  2.1 - 9.2 x10(3)/mcL Final    Mono # 07/16/2022 0.66  0.1 - 1.3 x10(3)/mcL Final    Eos # 07/16/2022 0.14  0 - 0.9 x10(3)/mcL Final    Baso # 07/16/2022 0.05  0 - 0.2 x10(3)/mcL Final    IG# 07/16/2022  0.04  0 - 0.04 x10(3)/mcL Final    IG% 07/16/2022 0.5  % Final    NRBC% 07/16/2022 0.0  % Final    RBC Morph 07/16/2022 Abnormal (A) Normal Final    Anisocyte 07/16/2022 1+ (A) (none) Final    Hypochrom 07/16/2022 1+ (A) (none) Final    Microcyte 07/16/2022 2+ (A) (none) Final    Ovalocytes 07/16/2022 Slight (A) (none) Final    Poik 07/16/2022 Slight (A) (none) Final    Platelet Est 07/16/2022 Adequate  Normal, Adequate Final       Imaging Results    None                                              Diagnostic Impression:    1. Perimenopausal menorrhagia         ED Disposition Condition    Discharge Stable           Follow-up Information     [facility] In 3 days.    Specialty: Family Medicine  Why: As needed  Contact information:  [address] [address]  [phone]                          ED Prescriptions     Medication Sig Dispense Start Date End Date Auth. Provider    dicyclomine (BENTYL) 10 MG capsule Take 1 capsule (10 mg total) by mouth 3 (three) times daily as needed (cramping). 15 capsule 7/16/2022  David Navas,     ferrous gluconate (FERGON) 324 MG tablet Take 1 tablet (324 mg total) by mouth daily with breakfast. 30 tablet 7/16/2022  David Navas DO        Follow-up Information     Follow up With Specialties Details Why Contact Info    [facility] In 3 days As needed [address] [address]  [phone]             David Navas DO  07/16/22 1557

## 2022-07-20 PROBLEM — D64.9 ANEMIA: Status: ACTIVE | Noted: 2022-07-20

## 2022-07-20 PROBLEM — E66.9 OBESITY: Status: ACTIVE | Noted: 2022-07-20

## 2022-07-20 PROBLEM — Z00.00 WELL ADULT EXAM: Status: ACTIVE | Noted: 2022-07-20

## 2022-07-20 PROBLEM — Z13.6 SCREENING FOR HYPERTENSION: Status: ACTIVE | Noted: 2022-07-20

## 2022-07-20 PROBLEM — E11.9 CONTROLLED TYPE 2 DIABETES MELLITUS WITHOUT COMPLICATION, WITHOUT LONG-TERM CURRENT USE OF INSULIN: Status: ACTIVE | Noted: 2022-07-20

## 2022-07-20 PROBLEM — F41.9 ANXIETY: Status: ACTIVE | Noted: 2022-07-20

## 2022-07-20 PROBLEM — R79.89 ABNORMAL TSH: Status: ACTIVE | Noted: 2022-07-20

## 2022-07-20 PROBLEM — E78.1 HYPERTRIGLYCERIDEMIA: Status: ACTIVE | Noted: 2022-07-20

## 2022-07-20 PROBLEM — G47.00 INSOMNIA: Status: ACTIVE | Noted: 2022-07-20

## 2022-07-20 PROBLEM — R31.9 HEMATURIA: Status: ACTIVE | Noted: 2022-07-20

## 2022-09-02 ENCOUNTER — OFFICE VISIT (OUTPATIENT)
Dept: INTERNAL MEDICINE | Facility: CLINIC | Age: 52
End: 2022-09-02
Payer: MEDICAID

## 2022-09-02 VITALS
SYSTOLIC BLOOD PRESSURE: 122 MMHG | TEMPERATURE: 98 F | HEIGHT: 60 IN | DIASTOLIC BLOOD PRESSURE: 77 MMHG | HEART RATE: 83 BPM | RESPIRATION RATE: 20 BRPM | WEIGHT: 148.81 LBS | BODY MASS INDEX: 29.22 KG/M2

## 2022-09-02 DIAGNOSIS — M65.342 TRIGGER FINGER, LEFT RING FINGER: ICD-10-CM

## 2022-09-02 DIAGNOSIS — E11.9 CONTROLLED TYPE 2 DIABETES MELLITUS WITHOUT COMPLICATION, WITHOUT LONG-TERM CURRENT USE OF INSULIN: ICD-10-CM

## 2022-09-02 DIAGNOSIS — Z00.00 WELL ADULT EXAM: Primary | ICD-10-CM

## 2022-09-02 DIAGNOSIS — Z13.6 SCREENING FOR HYPERTENSION: ICD-10-CM

## 2022-09-02 DIAGNOSIS — Z12.11 COLON CANCER SCREENING: ICD-10-CM

## 2022-09-02 DIAGNOSIS — L98.9 SKIN LESION OF CHEEK: ICD-10-CM

## 2022-09-02 DIAGNOSIS — E78.1 HYPERTRIGLYCERIDEMIA: ICD-10-CM

## 2022-09-02 PROCEDURE — 1159F MED LIST DOCD IN RCRD: CPT | Mod: CPTII,,, | Performed by: NURSE PRACTITIONER

## 2022-09-02 PROCEDURE — 99215 OFFICE O/P EST HI 40 MIN: CPT | Mod: PBBFAC | Performed by: NURSE PRACTITIONER

## 2022-09-02 PROCEDURE — 3078F PR MOST RECENT DIASTOLIC BLOOD PRESSURE < 80 MM HG: ICD-10-PCS | Mod: CPTII,,, | Performed by: NURSE PRACTITIONER

## 2022-09-02 PROCEDURE — 3074F PR MOST RECENT SYSTOLIC BLOOD PRESSURE < 130 MM HG: ICD-10-PCS | Mod: CPTII,,, | Performed by: NURSE PRACTITIONER

## 2022-09-02 PROCEDURE — 99214 PR OFFICE/OUTPT VISIT, EST, LEVL IV, 30-39 MIN: ICD-10-PCS | Mod: S$PBB,,, | Performed by: NURSE PRACTITIONER

## 2022-09-02 PROCEDURE — 99214 OFFICE O/P EST MOD 30 MIN: CPT | Mod: S$PBB,,, | Performed by: NURSE PRACTITIONER

## 2022-09-02 PROCEDURE — 3008F PR BODY MASS INDEX (BMI) DOCUMENTED: ICD-10-PCS | Mod: CPTII,,, | Performed by: NURSE PRACTITIONER

## 2022-09-02 PROCEDURE — 3074F SYST BP LT 130 MM HG: CPT | Mod: CPTII,,, | Performed by: NURSE PRACTITIONER

## 2022-09-02 PROCEDURE — 3008F BODY MASS INDEX DOCD: CPT | Mod: CPTII,,, | Performed by: NURSE PRACTITIONER

## 2022-09-02 PROCEDURE — 3078F DIAST BP <80 MM HG: CPT | Mod: CPTII,,, | Performed by: NURSE PRACTITIONER

## 2022-09-02 PROCEDURE — 1159F PR MEDICATION LIST DOCUMENTED IN MEDICAL RECORD: ICD-10-PCS | Mod: CPTII,,, | Performed by: NURSE PRACTITIONER

## 2022-09-02 RX ORDER — METFORMIN HYDROCHLORIDE 500 MG/1
500 TABLET ORAL
Qty: 30 TABLET | Refills: 6 | Status: SHIPPED | OUTPATIENT
Start: 2022-09-02 | End: 2023-02-01 | Stop reason: SDUPTHER

## 2022-09-02 RX ORDER — FERROUS GLUCONATE 324(38)MG
324 TABLET ORAL
Qty: 30 TABLET | Refills: 11 | Status: SHIPPED | OUTPATIENT
Start: 2022-09-02 | End: 2023-07-13 | Stop reason: SDUPTHER

## 2022-09-02 RX ORDER — PANTOPRAZOLE SODIUM 40 MG/1
40 TABLET, DELAYED RELEASE ORAL DAILY
Qty: 30 TABLET | Refills: 6 | Status: SHIPPED | OUTPATIENT
Start: 2022-09-02 | End: 2023-06-06 | Stop reason: SDUPTHER

## 2022-09-02 NOTE — PROGRESS NOTES
[facility]  [facility]     Patient Name: Sandra Vega   : 1970  MRN:[id number]     Review of patient's allergies indicates:  No Known Allergies   Medication List with Changes/Refills   Current Medications    ALBUTEROL (PROVENTIL/VENTOLIN HFA) 90 MCG/ACTUATION INHALER    INHALE 2 PUFFS BY MOUTH EVERY 4 HOURS AS NEEDED FOR SHORTNESS OF BREATH OR WHEEZING    ASPIRIN 81 MG CHEW    Take 81 mg by mouth once daily.    BLOOD SUGAR DIAGNOSTIC (ACCU-CHEK GUIDE TEST STRIPS) STRP    CHECK CBG ONCE DAILY AND KEEP LOG.    DICYCLOMINE (BENTYL) 10 MG CAPSULE    Take 1 capsule (10 mg total) by mouth 3 (three) times daily as needed (cramping).    FERROUS GLUCONATE (FERGON) 324 MG TABLET    Take 324 mg by mouth daily with breakfast.    FERROUS GLUCONATE (FERGON) 324 MG TABLET    Take 1 tablet (324 mg total) by mouth daily with breakfast.    HYDROXYZINE HCL (ATARAX) 25 MG TABLET    Take 25 mg by mouth 3 (three) times daily.    METFORMIN (GLUCOPHAGE) 500 MG TABLET    Take 500 mg by mouth 2 (two) times daily with meals.    PANTOPRAZOLE (PROTONIX) 40 MG TABLET    Take 40 mg by mouth once daily.        CC:  Chief Complaint   Patient presents with    Medication Refill        HPI  51 y/o female for f/u lab results. Pt has hx Anemia, Pre DM, Hypertriglyceridemia, Hematuria. Pt states she started an all vegan diet and feels better.          ROS  Review of Systems   Constitutional: Negative.    HENT: Negative.     Eyes: Negative.    Respiratory: Negative.     Cardiovascular: Negative.    Gastrointestinal: Negative.    Endocrine: Negative.    Genitourinary: Negative.    Musculoskeletal:         Left ring finger triggering.   Integumentary:  Positive for mole/lesion. Negative.   Allergic/Immunologic: Negative.    Neurological: Negative.    Hematological: Negative.    Psychiatric/Behavioral: Negative.        Physical Examination:  Vitals:    22 0858   BP: 122/77   Pulse: 83   Resp: 20   Temp: 97.7 °F (36.5 °C)           Physical Exam  Vitals reviewed.   Constitutional:       Appearance: Normal appearance. She is normal weight.   HENT:      Head: Normocephalic.   Cardiovascular:      Rate and Rhythm: Normal rate and regular rhythm.      Pulses: Normal pulses.      Heart sounds: Normal heart sounds.   Pulmonary:      Effort: Pulmonary effort is normal.      Breath sounds: Normal breath sounds.   Abdominal:      General: Abdomen is flat.      Palpations: Abdomen is soft.   Musculoskeletal:         General: Normal range of motion.      Cervical back: Normal range of motion.   Skin:     Comments: + Hyperpigmented mole to left cheek of face. Approx 1 cm X 1 cm. See pic in chart.    Neurological:      Mental Status: She is alert.   Psychiatric:         Mood and Affect: Mood normal.          Labs/Imaging:  Reviewed    Assessment/Plan:  1. Well adult exam  Labs in 3 months. UTD MMG. Refer to GYN cl for annual exam. UTD FIT- neg results 4-4-22.     2. Controlled type 2 diabetes mellitus without complication, without long-term current use of insulin  A1c 5.5 A1c in 3 months. ADA diet and exercise. Cont Metformin as prescribed. DM foot exam done today. DM eye exam done 3 months ago with Dr Bobo. Urine microalbumin 1-18-22. Pt UTD with pneumovax.  Diabetic foot exam:   Left: Reflexes 3+    Vibratory sensation normal   Proprioception normal   Sharp/dull discrimination normal   Filament test present  Right: Reflexes 3+    Vibratory sensation normal   Proprioception normal   Sharp/dull discrimination normal   Filament test present      3. Screening for hypertension  Labs in 3 months.     4. Hypertriglyceridemia    Low fat diet and exercise. FLP in 3 months.    5. Skin lesion face  Refer to minor sx cl for eval and mgmt. Pt states it has been increasing in size. See pic in chart.    6. Trigger finger left ring finger  Pt states symptoms started approx 1 week ago. Denies injury. Refer to Ortho for eval  and mgmt.     7. Colon CA screening    Pt had neg FIT test 4-4-22 however is requesting a colonoscopy be done. Refer to Dr Zamarripa for colonoscopy.       RTC in 3 months with labs 1 week prior to appt.

## 2022-09-07 NOTE — TELEPHONE ENCOUNTER
PA for pantoprazole sodium oral tablet delayed release 40 mg was not approved due to the pt exceeding the maximum duration of therapy. Which is 180 days.

## 2022-09-22 ENCOUNTER — HISTORICAL (OUTPATIENT)
Dept: ADMINISTRATIVE | Facility: HOSPITAL | Age: 52
End: 2022-09-22
Payer: MEDICAID

## 2022-10-17 DIAGNOSIS — E11.9 CONTROLLED TYPE 2 DIABETES MELLITUS WITHOUT COMPLICATION, WITHOUT LONG-TERM CURRENT USE OF INSULIN: ICD-10-CM

## 2022-10-24 PROBLEM — Z13.6 SCREENING FOR HYPERTENSION: Status: RESOLVED | Noted: 2022-07-20 | Resolved: 2022-10-24

## 2022-10-24 PROBLEM — Z00.00 WELL ADULT EXAM: Status: RESOLVED | Noted: 2022-07-20 | Resolved: 2022-10-24

## 2022-11-12 RX ORDER — PANTOPRAZOLE SODIUM 40 MG/1
40 TABLET, DELAYED RELEASE ORAL DAILY
Qty: 30 TABLET | Refills: 6 | OUTPATIENT
Start: 2022-11-12

## 2023-01-15 ENCOUNTER — HOSPITAL ENCOUNTER (EMERGENCY)
Facility: HOSPITAL | Age: 53
Discharge: HOME | End: 2023-01-16
Attending: EMERGENCY MEDICINE
Payer: OTHER MISCELLANEOUS

## 2023-01-15 DIAGNOSIS — S60.222A CONTUSION OF LEFT HAND, INITIAL ENCOUNTER: Primary | ICD-10-CM

## 2023-01-15 LAB — B-HCG SERPL QL: NEGATIVE

## 2023-01-15 PROCEDURE — 81025 URINE PREGNANCY TEST: CPT | Performed by: EMERGENCY MEDICINE

## 2023-01-15 PROCEDURE — 99283 EMERGENCY DEPT VISIT LOW MDM: CPT

## 2023-01-15 NOTE — Clinical Note
"Sandra"Oral Vega was seen and treated in our emergency department on 1/15/2023.  She may return to work on 01/19/2023.       If you have any questions or concerns, please don't hesitate to call.      Alma Garcia MD"

## 2023-01-16 VITALS
DIASTOLIC BLOOD PRESSURE: 75 MMHG | RESPIRATION RATE: 18 BRPM | TEMPERATURE: 98 F | HEART RATE: 79 BPM | OXYGEN SATURATION: 98 % | SYSTOLIC BLOOD PRESSURE: 118 MMHG

## 2023-01-16 PROCEDURE — 25000003 PHARM REV CODE 250: Performed by: EMERGENCY MEDICINE

## 2023-01-16 RX ORDER — IBUPROFEN 600 MG/1
600 TABLET ORAL EVERY 6 HOURS PRN
Qty: 20 TABLET | Refills: 0 | Status: SHIPPED | OUTPATIENT
Start: 2023-01-16

## 2023-01-16 RX ORDER — IBUPROFEN 600 MG/1
600 TABLET ORAL
Status: COMPLETED | OUTPATIENT
Start: 2023-01-16 | End: 2023-01-16

## 2023-01-16 RX ADMIN — IBUPROFEN 600 MG: 600 TABLET ORAL at 12:01

## 2023-01-16 NOTE — ED PROVIDER NOTES
Encounter Date: 1/15/2023       History     Chief Complaint   Patient presents with    left arm and hand pain     See MDM      Review of patient's allergies indicates:  No Known Allergies  History reviewed. No pertinent past medical history.  Past Surgical History:   Procedure Laterality Date     SECTION       Family History   Problem Relation Age of Onset    Arthritis Mother      Social History     Tobacco Use    Smoking status: Never    Smokeless tobacco: Never   Substance Use Topics    Alcohol use: Not Currently    Drug use: Never     Review of Systems   Musculoskeletal:         Left arm and hand pain   All other systems reviewed and are negative.    Physical Exam     Initial Vitals [01/15/23 2158]   BP Pulse Resp Temp SpO2   118/75 79 18 97.7 °F (36.5 °C) 98 %      MAP       --         Physical Exam    Nursing note and vitals reviewed.  Constitutional: She appears well-developed and well-nourished.   Musculoskeletal:      Comments: Left hand appears to be diffusely swollen on the palmar aspect with tenderness to the middle of the palm and to the ring finger.  She has full range of motion but it is very painful for her to fully close her hand particularly the ring finger.  It seems to be the entire palm which is swollen and tender as opposed to point tenderness.  Radial pulses 2+, no sign of dislocation, no laceration abrasion, normal sensation throughout.     Neurological: She is alert and oriented to person, place, and time.   Skin: Skin is warm and dry. Capillary refill takes less than 2 seconds.   Psychiatric: She has a normal mood and affect. Thought content normal.       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  She feels like her ring finger got twisted and now she can not fully close her hand and the ring finger is very stiff.  Differential Diagnosis:   Differential diagnosis includes but is not limited to strain, dislocation, fracture  Clinical Tests:   Radiological Study: Reviewed  ED Management:  X-ray is negative and she appears to have swelling diffusely to her palm and her ring finger.  It seems to be a strain with no sign of fracture dislocation.  She will need to follow-up with her primary care provider if the pain should continue.  She did request a pregnancy test stating that she is late on her..  She is 53 years old so would not thinks she would be pregnant but we did a pregnancy test anyway which was negative.                        Clinical Impression:   Final diagnoses:  [S60.222A] Contusion of left hand, initial encounter (Primary)        ED Disposition Condition    Discharge Stable          ED Prescriptions       Medication Sig Dispense Start Date End Date Auth. Provider    ibuprofen (ADVIL,MOTRIN) 600 MG tablet Take 1 tablet (600 mg total) by mouth every 6 (six) hours as needed for Pain. 20 tablet 1/16/2023 -- Alma Garcia MD          Follow-up Information       Follow up With Specialties Details Why Contact Info    [facility] Schedule an appointment as soon as possible for a visit   [address] [facility] [id number]  [phone]               Alma Garcia MD  01/16/23 0617

## 2023-01-19 ENCOUNTER — HOSPITAL ENCOUNTER (EMERGENCY)
Facility: HOSPITAL | Age: 53
Discharge: HOME | End: 2023-01-19
Attending: STUDENT IN AN ORGANIZED HEALTH CARE EDUCATION/TRAINING PROGRAM
Payer: OTHER MISCELLANEOUS

## 2023-01-19 VITALS
SYSTOLIC BLOOD PRESSURE: 128 MMHG | DIASTOLIC BLOOD PRESSURE: 89 MMHG | RESPIRATION RATE: 18 BRPM | BODY MASS INDEX: 30.29 KG/M2 | HEIGHT: 60 IN | TEMPERATURE: 98 F | OXYGEN SATURATION: 99 % | WEIGHT: 154.31 LBS | HEART RATE: 79 BPM

## 2023-01-19 DIAGNOSIS — S66.519A STRAIN OF TENDON OF INTRINSIC MUSCLE OF FINGER: Primary | ICD-10-CM

## 2023-01-19 PROCEDURE — 99282 EMERGENCY DEPT VISIT SF MDM: CPT

## 2023-01-19 NOTE — Clinical Note
"Sandra"Oral Vega was seen and treated in our emergency department on 1/19/2023.  She may return with limitations on 01/23/2023.  No lifting, pulling or pulling with left hand until patient can follow up with hand specialist or symptoms resolved     Sincerely,      Curly Menendez PA-C    "

## 2023-01-20 NOTE — ED PROVIDER NOTES
Encounter Date: 2023       History     Chief Complaint   Patient presents with    Hand Pain     Injured L third finger 3 days ago and was seen here. States is here for followup. Denies new injury     See MDM    The history is provided by the patient. No  was used.   Review of patient's allergies indicates:  No Known Allergies  No past medical history on file.  Past Surgical History:   Procedure Laterality Date     SECTION       Family History   Problem Relation Age of Onset    Arthritis Mother      Social History     Tobacco Use    Smoking status: Never    Smokeless tobacco: Never   Substance Use Topics    Alcohol use: Not Currently    Drug use: Never     Review of Systems   Constitutional:  Negative for fever.   HENT:  Negative for sore throat.    Respiratory:  Negative for shortness of breath.    Cardiovascular:  Negative for chest pain.   Gastrointestinal:  Negative for nausea.   Genitourinary:  Negative for dysuria.   Musculoskeletal:  Positive for arthralgias. Negative for back pain.   Skin:  Negative for rash.   Neurological:  Negative for weakness.   Hematological:  Does not bruise/bleed easily.   All other systems reviewed and are negative.    Physical Exam     Initial Vitals [23 1915]   BP Pulse Resp Temp SpO2   128/89 79 18 98.3 °F (36.8 °C) 99 %      MAP       --         Physical Exam    Nursing note and vitals reviewed.  Constitutional: She appears well-developed and well-nourished.   HENT:   Head: Normocephalic and atraumatic.   Eyes: EOM are normal. Pupils are equal, round, and reactive to light.   Neck: Neck supple.   Abdominal: Bowel sounds are normal.   Musculoskeletal:         General: Normal range of motion.      Cervical back: Neck supple.      Comments: No obvious deformity, swelling, erythema; patient able to fully flex all fingers except the ring finger; can only mildly flex at the PIP however unable to flex DIP; good sensation and cap refill      Neurological: She is alert and oriented to person, place, and time. She has normal strength. GCS score is 15. GCS eye subscore is 4. GCS verbal subscore is 5. GCS motor subscore is 6.   Skin: Skin is warm and dry. Capillary refill takes less than 2 seconds.   Psychiatric: She has a normal mood and affect.       ED Course   Procedures  Labs Reviewed - No data to display       Imaging Results    None          Medications - No data to display  Medical Decision Making:   History:   Old Records Summarized: other records.       <> Summary of Records: Previous ER visit on January 16 with x-ray of the left hand with no acute findings, sent home with ibuprofen which patient has been taking  Initial Assessment:   53-year-old female presents to ED with persistent left ring finger pain and difficulty bending onset 4 days ago after injury.  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  States she is taken ibuprofen at home with no relief.  States she still can not bend her finger.  Denies any new injury or trauma.  Differential Diagnosis:   Tendon injury, strain, sprain  ED Management:  Discussed with patient that her symptoms are likely due to attention injury, and refer to the hand specialist if her symptoms persist and do not resolve                        Clinical Impression:   Final diagnoses:  [S66.519A] Strain of tendon of intrinsic muscle of finger (Primary)        ED Disposition Condition    Discharge Stable          ED Prescriptions    None       Follow-up Information       Follow up With Specialties Details Why Contact Info    [facility]   [address] [facility] [id number] [phone]      Phi Beltran MD Hand Surgery Schedule an appointment as soon as possible for a visit  Hand specialist [address]  [facility] [id number] [phone]               Curly Menendez PA-C  01/19/23 2032

## 2023-02-01 ENCOUNTER — OFFICE VISIT (OUTPATIENT)
Dept: INTERNAL MEDICINE | Facility: CLINIC | Age: 53
End: 2023-02-01
Payer: MEDICAID

## 2023-02-01 ENCOUNTER — TELEPHONE (OUTPATIENT)
Dept: INTERNAL MEDICINE | Facility: CLINIC | Age: 53
End: 2023-02-01

## 2023-02-01 VITALS
HEIGHT: 60 IN | HEART RATE: 80 BPM | DIASTOLIC BLOOD PRESSURE: 80 MMHG | RESPIRATION RATE: 18 BRPM | SYSTOLIC BLOOD PRESSURE: 119 MMHG | WEIGHT: 146 LBS | TEMPERATURE: 98 F | BODY MASS INDEX: 28.66 KG/M2

## 2023-02-01 DIAGNOSIS — Z00.00 WELL ADULT EXAM: Primary | ICD-10-CM

## 2023-02-01 DIAGNOSIS — M65.342 TRIGGER FINGER, LEFT RING FINGER: ICD-10-CM

## 2023-02-01 DIAGNOSIS — Z12.31 BREAST CANCER SCREENING BY MAMMOGRAM: ICD-10-CM

## 2023-02-01 DIAGNOSIS — E11.9 CONTROLLED TYPE 2 DIABETES MELLITUS WITHOUT COMPLICATION, WITHOUT LONG-TERM CURRENT USE OF INSULIN: ICD-10-CM

## 2023-02-01 DIAGNOSIS — L98.9 SKIN LESION OF FACE: ICD-10-CM

## 2023-02-01 DIAGNOSIS — Z12.11 COLON CANCER SCREENING: ICD-10-CM

## 2023-02-01 DIAGNOSIS — E78.1 HYPERTRIGLYCERIDEMIA: ICD-10-CM

## 2023-02-01 DIAGNOSIS — Z13.6 SCREENING FOR HYPERTENSION: ICD-10-CM

## 2023-02-01 PROCEDURE — 3079F PR MOST RECENT DIASTOLIC BLOOD PRESSURE 80-89 MM HG: ICD-10-PCS | Mod: CPTII,,, | Performed by: NURSE PRACTITIONER

## 2023-02-01 PROCEDURE — 1159F MED LIST DOCD IN RCRD: CPT | Mod: CPTII,,, | Performed by: NURSE PRACTITIONER

## 2023-02-01 PROCEDURE — 1160F RVW MEDS BY RX/DR IN RCRD: CPT | Mod: CPTII,,, | Performed by: NURSE PRACTITIONER

## 2023-02-01 PROCEDURE — 99215 OFFICE O/P EST HI 40 MIN: CPT | Mod: PBBFAC | Performed by: NURSE PRACTITIONER

## 2023-02-01 PROCEDURE — 3008F BODY MASS INDEX DOCD: CPT | Mod: CPTII,,, | Performed by: NURSE PRACTITIONER

## 2023-02-01 PROCEDURE — 3074F PR MOST RECENT SYSTOLIC BLOOD PRESSURE < 130 MM HG: ICD-10-PCS | Mod: CPTII,,, | Performed by: NURSE PRACTITIONER

## 2023-02-01 PROCEDURE — 3079F DIAST BP 80-89 MM HG: CPT | Mod: CPTII,,, | Performed by: NURSE PRACTITIONER

## 2023-02-01 PROCEDURE — 1159F PR MEDICATION LIST DOCUMENTED IN MEDICAL RECORD: ICD-10-PCS | Mod: CPTII,,, | Performed by: NURSE PRACTITIONER

## 2023-02-01 PROCEDURE — 3074F SYST BP LT 130 MM HG: CPT | Mod: CPTII,,, | Performed by: NURSE PRACTITIONER

## 2023-02-01 PROCEDURE — 3008F PR BODY MASS INDEX (BMI) DOCUMENTED: ICD-10-PCS | Mod: CPTII,,, | Performed by: NURSE PRACTITIONER

## 2023-02-01 PROCEDURE — 99214 PR OFFICE/OUTPT VISIT, EST, LEVL IV, 30-39 MIN: ICD-10-PCS | Mod: S$PBB,,, | Performed by: NURSE PRACTITIONER

## 2023-02-01 PROCEDURE — 99214 OFFICE O/P EST MOD 30 MIN: CPT | Mod: S$PBB,,, | Performed by: NURSE PRACTITIONER

## 2023-02-01 PROCEDURE — 1160F PR REVIEW ALL MEDS BY PRESCRIBER/CLIN PHARMACIST DOCUMENTED: ICD-10-PCS | Mod: CPTII,,, | Performed by: NURSE PRACTITIONER

## 2023-02-01 RX ORDER — METFORMIN HYDROCHLORIDE 500 MG/1
500 TABLET ORAL
Qty: 30 TABLET | Refills: 6 | Status: SHIPPED | OUTPATIENT
Start: 2023-02-01 | End: 2023-07-13 | Stop reason: SDUPTHER

## 2023-02-01 NOTE — TELEPHONE ENCOUNTER
----- Message from [facility] sent at 2/1/2023 10:44 AM CST -----  Pt had DM eye exam done with Dr Bobo in June 2022. Please get copy of results sent to pt's chart.

## 2023-02-01 NOTE — PROGRESS NOTES
Patient ID: [id number]     Chief Complaint: Diabetes        HPI:     HPI      Sandra Vega is a 53 y.o. female here today for a follow up.         No past medical history on file.     Past Surgical History:   Procedure Laterality Date     SECTION         Review of patient's allergies indicates:  No Known Allergies    Current Outpatient Medications   Medication Instructions    albuterol (PROVENTIL/VENTOLIN HFA) 90 mcg/actuation inhaler INHALE 2 PUFFS BY MOUTH EVERY 4 HOURS AS NEEDED FOR SHORTNESS OF BREATH OR WHEEZING    aspirin 81 mg, Oral, Daily    blood sugar diagnostic (ACCU-CHEK GUIDE TEST STRIPS) Strp CHECK CBG ONCE DAILY AND KEEP LOG.    dicyclomine (BENTYL) 10 mg, Oral, 3 times daily PRN    ferrous gluconate (FERGON) 324 mg, Oral, With breakfast    hydrOXYzine HCL (ATARAX) 25 mg, Oral, 3 times daily    ibuprofen (ADVIL,MOTRIN) 600 mg, Oral, Every 6 hours PRN    metFORMIN (GLUCOPHAGE) 500 mg, Oral, With breakfast    pantoprazole (PROTONIX) 40 mg, Oral, Daily       Social History     Socioeconomic History    Marital status: Unknown   Tobacco Use    Smoking status: Never    Smokeless tobacco: Never   Substance and Sexual Activity    Alcohol use: Not Currently    Drug use: Never     Social Determinants of Health     Physical Activity: Sufficiently Active    Days of Exercise per Week: 7 days    Minutes of Exercise per Session: 30 min        Family History   Problem Relation Age of Onset    Arthritis Mother         Patient Care Team:  [facility] as PCP - General (Family Medicine)     Subjective:     Review of Systems     See HPI for details    Constitutional: Denies Change in appetite. Denies Chills. Denies Fever. Denies Night sweats.  Eye: Denies Blurred vision. Denies Discharge. Denies Eye pain.  ENT: Denies Decreased hearing. Denies Sore throat. Denies Swollen glands.  Respiratory: Denies Cough. Denies Shortness of breath. Denies Shortness of breath with exertion. Denies  Wheezing.  Cardiovascular: DeniesChest pain at rest. Denies Chest pain with exertion. Denies Irregular heartbeat. Denies Palpitations. Denies Edema.  Gastrointestinal: Denies Abdominal pain. DeniesDiarrhea. Denies Nausea. Denies Vomiting. Denies Hematemesis or Hematochezia.  Genitourinary: Denies Dysuria. Denies Urinary frequency. Denies Urinary urgency. Denies Blood in urine.  Endocrine: Denies Cold intolerance. Denies Excessive thirst. Denies Heat intolerance. Denies Weight loss. Denies Weight gain.  Musculoskeletal: Denies Painful joints. Denies Weakness.  Integumentary: Denies Rash. Denies Itching. Denies Dry skin.  Neurologic: Denies Dizziness. Denies Fainting. Denies Headache.  Psychiatric: Denies Depression. Denies Anxiety. Denies Suicidal/Homicidal ideations.    All Other ROS: Negative except as stated in HPI.       Objective:     Visit Vitals  /80 (BP Location: Left arm, Patient Position: Sitting, BP Method: Small (Automatic))   Pulse 80   Temp 97.7 °F (36.5 °C) (Oral)   Resp 18   Ht 5' (1.524 m)   Wt 66.2 kg (146 lb)   BMI 28.51 kg/m²       Physical Exam    General: Alert and oriented, No acute distress.  Head: Normocephalic, Atraumatic.  Eye: Pupils are equal, round and reactive to light, Extraocular movements are intact, Sclera non-icteric.  Ears/Nose/Throat: Normal, Mucosa moist,Clear.  Neck/Thyroid: Supple, Non-tender, No carotid bruit, No lymphadenopathy, No JVD, Full range of motion.  Respiratory: Clear to auscultation bilaterally; No wheezes, rales or rhonchi,Non-labored respirations, Symmetrical chest wall expansion.  Cardiovascular: Regular rate and rhythm, S1/S2 normal, No murmurs, rubs or gallops.  Gastrointestinal: Soft, Non-tender, Non-distended, Normal bowel sounds, No palpable organomegaly.  Musculoskeletal: Normal range of motion.  Integumentary: Warm, Dry, Intact, No suspicious lesions or rashes.  Extremities: No clubbing, cyanosis or edema  Neurologic: No focal deficits, Cranial  Nerves II-XII are grossly intact, Motor strength normal upper and lower extremities, Sensory exam intact.  Psychiatric: Normal interaction, Coherent speech, Euthymic mood, Appropriate affect       Labs Reviewed:     Chemistry:  Lab Results   Component Value Date     07/06/2022    K 3.8 07/06/2022    CHLORIDE 106 07/06/2022    BUN 9.8 07/06/2022    CREATININE 0.72 07/06/2022    GLUCOSE 107 (H) 07/06/2022    CALCIUM 8.9 07/06/2022    ALKPHOS 76 07/06/2022    LABPROT 7.0 07/06/2022    ALBUMIN 3.5 07/06/2022    BILIDIR 0.1 01/18/2022    IBILI 0.20 01/18/2022    AST 23 07/06/2022    ALT 20 07/06/2022        Lab Results   Component Value Date    HGBA1C 5.5 01/18/2022        Hematology:  Lab Results   Component Value Date    WBC 7.6 07/16/2022    HGB 9.0 (L) 07/16/2022    HCT 28.8 (L) 07/16/2022     07/16/2022       Lipid Panel:  Lab Results   Component Value Date    CHOL 207 (H) 01/18/2022    HDL 44 01/18/2022    .00 (H) 01/18/2022    TRIG 88 01/18/2022    TOTALCHOLEST 5 01/18/2022        Urine:  Lab Results   Component Value Date    APPEARANCEUA Clear 01/18/2022    PROTEINUA Trace (A) 01/18/2022    LEUKOCYTESUR 75 (A) 01/18/2022    RBCUA 0-5 01/18/2022    WBCUA 11-20 (A) 01/18/2022    BACTERIA Trace (A) 01/18/2022    SQEPUA 2-20 11/02/2020    HYALINECASTS 0-2 (A) 01/18/2022    CREATRANDUR 197.3 (H) 01/18/2022        Assessment:       ICD-10-CM ICD-9-CM   1. Well adult exam  Z00.00 V70.0   2. Controlled type 2 diabetes mellitus without complication, without long-term current use of insulin  E11.9 250.00   3. Screening for hypertension  Z13.6 V81.1   4. Hypertriglyceridemia  E78.1 272.1   5. Trigger finger, left ring finger  M65.342 727.03   6. Colon cancer screening  Z12.11 V76.51        Plan:     1. Well adult exam  Labs in 1 month. MMG in 2 months. Keep GYN appt as scheduled. Pt referred to Dr Zamarripa for colonoscopy 9-2-22.    2. Controlled type 2 diabetes mellitus without complication, without  long-term current use of insulin  A1c 5.5 A1c in 1 months. ADA diet and exercise. Cont Metformin as prescribed. DM foot exam done 9-2-22. DM eye exam done 3 months ago with Dr Bobo- will get staff to get copy of DM eye exam. Urine microalbumin 1-18-22- will get in 1 month. Pt UTD with pneumovax.    3. Screening for hypertension  Labs in 1 month.     4. Hypertriglyceridemia  Low fat diet and exercise.     5. Trigger finger, left ring finger  Pt referred to Ortho cl.     6. Colon cancer screening  Pt referred to Dr Zamarripa for colonoscopy 9-2-22.          Follow up in about 1 month (around 3/1/2023) for Telemed with labs 1 week prior to appt.. In addition to their scheduled follow up, the patient has also been instructed to follow up on as needed basis.     Future Appointments   Date Time Provider Department Center   6/5/2023 10:10 AM Joann Briones, [facility] [place], [facility]

## 2023-02-02 ENCOUNTER — HOSPITAL ENCOUNTER (EMERGENCY)
Facility: HOSPITAL | Age: 53
Discharge: HOME | End: 2023-02-02
Attending: INTERNAL MEDICINE
Payer: COMMERCIAL

## 2023-02-02 VITALS
HEART RATE: 78 BPM | DIASTOLIC BLOOD PRESSURE: 83 MMHG | RESPIRATION RATE: 20 BRPM | BODY MASS INDEX: 28.66 KG/M2 | SYSTOLIC BLOOD PRESSURE: 134 MMHG | OXYGEN SATURATION: 98 % | WEIGHT: 146 LBS | TEMPERATURE: 97 F | HEIGHT: 60 IN

## 2023-02-02 DIAGNOSIS — Z00.00 GENERAL MEDICAL EXAM: Primary | ICD-10-CM

## 2023-02-02 PROCEDURE — 99281 EMR DPT VST MAYX REQ PHY/QHP: CPT

## 2023-02-02 NOTE — Clinical Note
"Sandra"Oral Vega was seen and treated in our emergency department on 2/2/2023.  She may return to work on 02/03/2023.       If you have any questions or concerns, please don't hesitate to call.      RACHELLE Connor"

## 2023-02-02 NOTE — ED PROVIDER NOTES
Encounter Date: 2023       History     Chief Complaint   Patient presents with    medical screening     Pt states is only here to get paper work stating she can return to work s/p finger injury.     53-year-old female presents to ED for evaluation to return to work.  Patient reports that she was seen here 2 weeks ago after a finger injury and is requesting clearance to return to work.  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  Was seen here multiple times and referred to orthopedics for further evaluation.  Patient has not followed up.  Patient states that she was seen by PCP and was told that she needed to return to ED. patient has full range of motion and denies any pain.     The history is provided by the patient. No  was used.   Review of patient's allergies indicates:  No Known Allergies  No past medical history on file.  Past Surgical History:   Procedure Laterality Date     SECTION       Family History   Problem Relation Age of Onset    Arthritis Mother      Social History     Tobacco Use    Smoking status: Never    Smokeless tobacco: Never   Substance Use Topics    Alcohol use: Not Currently    Drug use: Never     Review of Systems   Constitutional:  Negative for fever.   HENT:  Negative for sore throat.    Respiratory:  Negative for shortness of breath.    Cardiovascular:  Negative for chest pain.   Gastrointestinal:  Negative for nausea.   Genitourinary:  Negative for dysuria.   Musculoskeletal:  Negative for arthralgias, back pain, joint swelling and myalgias.   Skin:  Negative for rash.   Neurological:  Negative for weakness.   Hematological:  Does not bruise/bleed easily.   All other systems reviewed and are negative.    Physical Exam     Initial Vitals [23 1713]   BP Pulse Resp Temp SpO2   134/83 78 20 97.3 °F (36.3 °C) 98 %      MAP       --         Physical Exam    Vitals reviewed.  Constitutional: She appears  well-developed.   HENT:   Head: Normocephalic and atraumatic.   Right Ear: External ear normal.   Left Ear: External ear normal.   Eyes: Conjunctivae are normal. Pupils are equal, round, and reactive to light.   Neck: Neck supple.   Normal range of motion.  Cardiovascular:  Normal rate, regular rhythm and normal heart sounds.           Pulmonary/Chest: Breath sounds normal. She has no wheezes. She has no rhonchi. She has no rales.   Abdominal: Abdomen is soft. Bowel sounds are normal. There is no abdominal tenderness.   Musculoskeletal:         General: Normal range of motion.      Right hand: Normal.      Left hand: Normal.      Cervical back: Normal range of motion and neck supple.      Comments: Patient has full range of motion of left hand and fingers. No swelling noted.  Cap refill less than 2 seconds.  Radial pulses 2+.  All other adjacent joints negative.     Neurological: She is alert and oriented to person, place, and time. She has normal strength. No cranial nerve deficit or sensory deficit. GCS score is 15. GCS eye subscore is 4. GCS verbal subscore is 5. GCS motor subscore is 6.   Skin: Skin is warm and dry.   Psychiatric: She has a normal mood and affect.       ED Course   Procedures  Labs Reviewed - No data to display       Imaging Results    None          Medications - No data to display  Medical Decision Making:   Differential Diagnosis:   Clearance for return to work, medical evaluation, left hand pain, work injury, hand injury  ED Management:  53-year-old female presents to ED for clearance to return to work.  Patient states that she was involved in a work-related injury where she hyperextended her finger.  Reports that she was seen here multiple times and told needed to follow-up with orthopedic surgeon.  Patient states that she has not followed up.  Reports that pain and swelling has improved and would now like to return to work.  States she does not feel like she needs to see the orthopedic  doctor.  Discussed with her that I can not give her return to work clearance and will need to follow-up with PCP/Orthopedics or workman's comp physician.                         Clinical Impression:   Final diagnoses:  [Z00.00] General medical exam (Primary)        ED Disposition Condition    Discharge Stable          ED Prescriptions    None       Follow-up Information       Follow up With Specialties Details Why Contact Info    Tabitha Ward, [facility]   [address] [facility] [id number] [phone]      Phi Beltran MD Hand Surgery, Orthopedic Surgery Call   [address]  [facility] [id number] [phone]               RACHELLE Connor  02/02/23 1457

## 2023-04-04 ENCOUNTER — DOCUMENTATION ONLY (OUTPATIENT)
Dept: INTERNAL MEDICINE | Facility: CLINIC | Age: 53
End: 2023-04-04
Payer: OTHER MISCELLANEOUS

## 2023-04-06 ENCOUNTER — DOCUMENTATION ONLY (OUTPATIENT)
Dept: ADMINISTRATIVE | Facility: HOSPITAL | Age: 53
End: 2023-04-06
Payer: OTHER MISCELLANEOUS

## 2023-05-08 PROBLEM — Z00.00 WELL ADULT EXAM: Status: RESOLVED | Noted: 2022-07-20 | Resolved: 2023-05-08

## 2023-05-08 PROBLEM — Z13.6 SCREENING FOR HYPERTENSION: Status: RESOLVED | Noted: 2023-02-01 | Resolved: 2023-05-08

## 2023-06-05 ENCOUNTER — OFFICE VISIT (OUTPATIENT)
Dept: GYNECOLOGY | Facility: CLINIC | Age: 53
End: 2023-06-05
Payer: COMMERCIAL

## 2023-06-05 VITALS
DIASTOLIC BLOOD PRESSURE: 85 MMHG | HEIGHT: 60 IN | HEART RATE: 80 BPM | TEMPERATURE: 98 F | WEIGHT: 145.63 LBS | BODY MASS INDEX: 28.59 KG/M2 | OXYGEN SATURATION: 100 % | RESPIRATION RATE: 20 BRPM | SYSTOLIC BLOOD PRESSURE: 146 MMHG

## 2023-06-05 DIAGNOSIS — Z12.4 PAP SMEAR FOR CERVICAL CANCER SCREENING: Primary | ICD-10-CM

## 2023-06-05 PROCEDURE — 3008F BODY MASS INDEX DOCD: CPT | Mod: CPTII,,, | Performed by: NURSE PRACTITIONER

## 2023-06-05 PROCEDURE — 99386 PR PREVENTIVE VISIT,NEW,40-64: ICD-10-PCS | Mod: S$PBB,,, | Performed by: NURSE PRACTITIONER

## 2023-06-05 PROCEDURE — 88174 CYTOPATH C/V AUTO IN FLUID: CPT | Performed by: NURSE PRACTITIONER

## 2023-06-05 PROCEDURE — 3077F SYST BP >= 140 MM HG: CPT | Mod: CPTII,,, | Performed by: NURSE PRACTITIONER

## 2023-06-05 PROCEDURE — 3079F DIAST BP 80-89 MM HG: CPT | Mod: CPTII,,, | Performed by: NURSE PRACTITIONER

## 2023-06-05 PROCEDURE — 99386 PREV VISIT NEW AGE 40-64: CPT | Mod: S$PBB,,, | Performed by: NURSE PRACTITIONER

## 2023-06-05 PROCEDURE — 3077F PR MOST RECENT SYSTOLIC BLOOD PRESSURE >= 140 MM HG: ICD-10-PCS | Mod: CPTII,,, | Performed by: NURSE PRACTITIONER

## 2023-06-05 PROCEDURE — 87624 HPV HI-RISK TYP POOLED RSLT: CPT

## 2023-06-05 PROCEDURE — 99214 OFFICE O/P EST MOD 30 MIN: CPT | Mod: PBBFAC | Performed by: NURSE PRACTITIONER

## 2023-06-05 PROCEDURE — 1159F PR MEDICATION LIST DOCUMENTED IN MEDICAL RECORD: ICD-10-PCS | Mod: CPTII,,, | Performed by: NURSE PRACTITIONER

## 2023-06-05 PROCEDURE — 1159F MED LIST DOCD IN RCRD: CPT | Mod: CPTII,,, | Performed by: NURSE PRACTITIONER

## 2023-06-05 PROCEDURE — 3008F PR BODY MASS INDEX (BMI) DOCUMENTED: ICD-10-PCS | Mod: CPTII,,, | Performed by: NURSE PRACTITIONER

## 2023-06-05 PROCEDURE — 3079F PR MOST RECENT DIASTOLIC BLOOD PRESSURE 80-89 MM HG: ICD-10-PCS | Mod: CPTII,,, | Performed by: NURSE PRACTITIONER

## 2023-06-05 NOTE — PROGRESS NOTES
Subjective:       Patient ID: Sandra Vega is a 53 y.o. female.    Chief Complaint:  Gynecologic Exam      History of Present Illness  The patient  here for annual exam. Her LMP was 2023. Period last 5 days and changes pads 3x/day. Pt has skipped cycle in  and May of . Denies history of abnormal paps. Last pap . MG-3/18/222 & BIRADS 1. Denies breast or urinary complaints. Denies pelvic pain, abnormal bleeding or discharge. Pt reports no STIs in the past and no concerns. Pt states she is currently not sexually active. Denies tobacco use. Dep. screening 0. Denies fly hx of breast, ovarian, uterine or colon cancer.     GYN & OB History  Patient's last menstrual period was 2023 (approximate).   Date of Last Pap: 2017    Review of patient's allergies indicates:  No Known Allergies  Past Medical History:   Diagnosis Date    Anemia      OB History    Para Term  AB Living   3 2           SAB IAB Ectopic Multiple Live Births                  # Outcome Date GA Lbr Pedro/2nd Weight Sex Delivery Anes PTL Lv   3             2 Para            1 Para                 Review of Systems  Review of Systems    Negative except for pertinent findings for positives per HPI     Objective:    Physical Exam    BP (!) 146/85 (BP Location: Left arm, Patient Position: Sitting, BP Method: Medium (Automatic))   Pulse 80   Temp 97.9 °F (36.6 °C) (Oral)   Resp 20   Ht 5' (1.524 m)   Wt 66 kg (145 lb 9.6 oz)   LMP 2023 (Approximate)   SpO2 100%   BMI 28.44 kg/m²   GENERAL: Well-developed female in no acute distress.  SKIN: Normal to inspection, warm and intact.  BREASTS: No masses, lumps, discharge, tenderness.  VULVA: General appearance normal; external genitalia with no lesions or erythema.  VAGINA: Mucosa normal, pink, no abnormal discharge or lesions.  CERVIX: pink, and high and anterior in vaginal vault, no erythema or abnormal discharge.  BIMANUAL EXAM: reveals a 10 week-sized  uterus. The uterus is non tender. Ab adnexa reveal no evidence of masses, tenderness.  PSYCHIATRIC: Patient is oriented to person, place, and time. Mood and affect are normal.    Assessment:       1. Pap smear for cervical cancer screening       Plan:   Sandra was seen today for gynecologic exam.    Diagnoses and all orders for this visit:    Pap smear for cervical cancer screening  -     Ambulatory referral/consult to Obstetrics / Gynecology  -     Liquid-Based Pap Smear, Screening Screening    Pap today  Monitor cycles, 1 full year will indicate menopause  Schedule MG ordered per PCP, number provided  Referral for colonoscopy per PCP  Follow up in about 1 year (around 6/5/2024) for annual exam.

## 2023-06-06 ENCOUNTER — OFFICE VISIT (OUTPATIENT)
Dept: INTERNAL MEDICINE | Facility: CLINIC | Age: 53
End: 2023-06-06
Payer: COMMERCIAL

## 2023-06-06 VITALS
WEIGHT: 145 LBS | HEIGHT: 60 IN | DIASTOLIC BLOOD PRESSURE: 70 MMHG | SYSTOLIC BLOOD PRESSURE: 119 MMHG | BODY MASS INDEX: 28.47 KG/M2 | RESPIRATION RATE: 18 BRPM | HEART RATE: 73 BPM | TEMPERATURE: 98 F

## 2023-06-06 DIAGNOSIS — Z12.11 COLON CANCER SCREENING: ICD-10-CM

## 2023-06-06 DIAGNOSIS — I10 PRIMARY HYPERTENSION: ICD-10-CM

## 2023-06-06 DIAGNOSIS — L98.9 SKIN LESION: ICD-10-CM

## 2023-06-06 DIAGNOSIS — E78.1 HYPERTRIGLYCERIDEMIA: ICD-10-CM

## 2023-06-06 DIAGNOSIS — E11.9 CONTROLLED TYPE 2 DIABETES MELLITUS WITHOUT COMPLICATION, WITHOUT LONG-TERM CURRENT USE OF INSULIN: ICD-10-CM

## 2023-06-06 DIAGNOSIS — E11.9 CONTROLLED TYPE 2 DIABETES MELLITUS WITHOUT COMPLICATION, WITHOUT LONG-TERM CURRENT USE OF INSULIN: Primary | ICD-10-CM

## 2023-06-06 DIAGNOSIS — Z00.00 WELL ADULT EXAM: Primary | ICD-10-CM

## 2023-06-06 PROCEDURE — 1160F RVW MEDS BY RX/DR IN RCRD: CPT | Mod: CPTII,,, | Performed by: NURSE PRACTITIONER

## 2023-06-06 PROCEDURE — 1159F PR MEDICATION LIST DOCUMENTED IN MEDICAL RECORD: ICD-10-PCS | Mod: CPTII,,, | Performed by: NURSE PRACTITIONER

## 2023-06-06 PROCEDURE — 1160F PR REVIEW ALL MEDS BY PRESCRIBER/CLIN PHARMACIST DOCUMENTED: ICD-10-PCS | Mod: CPTII,,, | Performed by: NURSE PRACTITIONER

## 2023-06-06 PROCEDURE — 3008F BODY MASS INDEX DOCD: CPT | Mod: CPTII,,, | Performed by: NURSE PRACTITIONER

## 2023-06-06 PROCEDURE — 3074F SYST BP LT 130 MM HG: CPT | Mod: CPTII,,, | Performed by: NURSE PRACTITIONER

## 2023-06-06 PROCEDURE — 1159F MED LIST DOCD IN RCRD: CPT | Mod: CPTII,,, | Performed by: NURSE PRACTITIONER

## 2023-06-06 PROCEDURE — 3078F PR MOST RECENT DIASTOLIC BLOOD PRESSURE < 80 MM HG: ICD-10-PCS | Mod: CPTII,,, | Performed by: NURSE PRACTITIONER

## 2023-06-06 PROCEDURE — 3074F PR MOST RECENT SYSTOLIC BLOOD PRESSURE < 130 MM HG: ICD-10-PCS | Mod: CPTII,,, | Performed by: NURSE PRACTITIONER

## 2023-06-06 PROCEDURE — 3078F DIAST BP <80 MM HG: CPT | Mod: CPTII,,, | Performed by: NURSE PRACTITIONER

## 2023-06-06 PROCEDURE — 99214 OFFICE O/P EST MOD 30 MIN: CPT | Mod: PBBFAC | Performed by: NURSE PRACTITIONER

## 2023-06-06 PROCEDURE — 99396 PREV VISIT EST AGE 40-64: CPT | Mod: S$PBB,,, | Performed by: NURSE PRACTITIONER

## 2023-06-06 PROCEDURE — 99396 PR PREVENTIVE VISIT,EST,40-64: ICD-10-PCS | Mod: S$PBB,,, | Performed by: NURSE PRACTITIONER

## 2023-06-06 PROCEDURE — 3008F PR BODY MASS INDEX (BMI) DOCUMENTED: ICD-10-PCS | Mod: CPTII,,, | Performed by: NURSE PRACTITIONER

## 2023-06-06 RX ORDER — ALBUTEROL SULFATE 90 UG/1
AEROSOL, METERED RESPIRATORY (INHALATION)
Qty: 8.5 G | Refills: 0
Start: 2023-06-06 | End: 2023-07-13 | Stop reason: SDUPTHER

## 2023-06-06 RX ORDER — PANTOPRAZOLE SODIUM 40 MG/1
40 TABLET, DELAYED RELEASE ORAL DAILY
Qty: 30 TABLET | Refills: 0 | Status: SHIPPED | OUTPATIENT
Start: 2023-06-06 | End: 2023-07-13 | Stop reason: SDUPTHER

## 2023-06-06 NOTE — PROGRESS NOTES
Patient ID: [id number]     Chief Complaint: Diabetes        HPI:     HPI      Sandra Vega is a 53 y.o. female here today for a follow up.         ----------------------------  Anemia  Primary hypertension     Past Surgical History:   Procedure Laterality Date     SECTION         Review of patient's allergies indicates:  No Known Allergies    Current Outpatient Medications   Medication Instructions    albuterol (PROVENTIL/VENTOLIN HFA) 90 mcg/actuation inhaler INHALE 2 PUFFS BY MOUTH EVERY 4 HOURS AS NEEDED FOR SHORTNESS OF BREATH OR WHEEZING    aspirin 81 mg, Oral, Daily    blood sugar diagnostic (ACCU-CHEK GUIDE TEST STRIPS) Strp CHECK CBG ONCE DAILY AND KEEP LOG.    dicyclomine (BENTYL) 10 mg, Oral, 3 times daily PRN    ferrous gluconate (FERGON) 324 mg, Oral, With breakfast    hydrOXYzine HCL (ATARAX) 25 mg, Oral, 3 times daily    ibuprofen (ADVIL,MOTRIN) 600 mg, Oral, Every 6 hours PRN    metFORMIN (GLUCOPHAGE) 500 mg, Oral, With breakfast    pantoprazole (PROTONIX) 40 mg, Oral, Daily       Social History     Socioeconomic History    Marital status: Unknown   Tobacco Use    Smoking status: Never    Smokeless tobacco: Never   Substance and Sexual Activity    Alcohol use: Yes    Drug use: Never    Sexual activity: Not Currently     Social Determinants of Health     Financial Resource Strain: Low Risk     Difficulty of Paying Living Expenses: Not hard at all   Food Insecurity: No Food Insecurity    Worried About Running Out of Food in the Last Year: Never true    Ran Out of Food in the Last Year: Never true   Transportation Needs: No Transportation Needs    Lack of Transportation (Medical): No    Lack of Transportation (Non-Medical): No   Physical Activity: Sufficiently Active    Days of Exercise per Week: 7 days    Minutes of Exercise per Session: 30 min   Social Connections: Moderately Isolated    Frequency of Communication with Friends and Family: More than three times a week    Frequency of Social  Gatherings with Friends and Family: Once a week    Attends [employer] Services: Never    Active Member of Clubs or Organizations: No    Attends Club or Organization Meetings: Never    Marital Status:    Housing Stability: Low Risk     Unable to Pay for Housing in the Last Year: No    Number of Places Lived in the Last Year: 1    Unstable Housing in the Last Year: No        Family History   Problem Relation Age of Onset    Arthritis Mother     Alzheimer's disease Mother         Patient Care Team:  PHYLLIS Stevenson as PCP - General (Family Medicine)     Subjective:     Review of Systems     See HPI for details    Constitutional: Denies Change in appetite. Denies Chills. Denies Fever. Denies Night sweats.  Eye: Denies Blurred vision. Denies Discharge. Denies Eye pain.  ENT: Denies Decreased hearing. Denies Sore throat. Denies Swollen glands.  Respiratory: Denies Cough. Denies Shortness of breath. Denies Shortness of breath with exertion. Denies Wheezing.  Cardiovascular: DeniesChest pain at rest. Denies Chest pain with exertion. Denies Irregular heartbeat. Denies Palpitations. Denies Edema.  Gastrointestinal: Denies Abdominal pain. DeniesDiarrhea. Denies Nausea. Denies Vomiting. Denies Hematemesis or Hematochezia.  Genitourinary: Denies Dysuria. Denies Urinary frequency. Denies Urinary urgency. Denies Blood in urine.  Endocrine: Denies Cold intolerance. Denies Excessive thirst. Denies Heat intolerance. Denies Weight loss. Denies Weight gain.  Musculoskeletal: Denies Painful joints. Denies Weakness.  Integumentary: Denies Rash. Denies Itching. Denies Dry skin.  Neurologic: Denies Dizziness. Denies Fainting. Denies Headache.  Psychiatric: Denies Depression. Denies Anxiety. Denies Suicidal/Homicidal ideations.    All Other ROS: Negative except as stated in HPI.       Objective:     Visit Vitals  /70 (BP Location: Right arm, Patient Position: Sitting, BP Method: Medium (Automatic))   Pulse 73   Temp 97.7  °F (36.5 °C) (Oral)   Resp 18   Ht 5' (1.524 m)   Wt 65.8 kg (145 lb)   LMP 04/24/2023 (Approximate)   BMI 28.32 kg/m²       Physical Exam    General: Alert and oriented, No acute distress.  Head: Normocephalic, Atraumatic.  Eye: Pupils are equal, round and reactive to light, Extraocular movements are intact, Sclera non-icteric.  Ears/Nose/Throat: Normal, Mucosa moist,Clear.  Neck/Thyroid: Supple, Non-tender, No carotid bruit, No lymphadenopathy, No JVD, Full range of motion.  Respiratory: Clear to auscultation bilaterally; No wheezes, rales or rhonchi,Non-labored respirations, Symmetrical chest wall expansion.  Cardiovascular: Regular rate and rhythm, S1/S2 normal, No murmurs, rubs or gallops.  Gastrointestinal: Soft, Non-tender, Non-distended, Normal bowel sounds, No palpable organomegaly.  Musculoskeletal: Normal range of motion.  Integumentary: + approx 1 cm X 1 cm hyperpigmented slightly raised skin lesion to left face cheek. No redness or drainage noted.   Extremities: No clubbing, cyanosis or edema  Neurologic: No focal deficits, Cranial Nerves II-XII are grossly intact, Motor strength normal upper and lower extremities, Sensory exam intact.  Psychiatric: Normal interaction, Coherent speech, Euthymic mood, Appropriate affect       Labs Reviewed:     Chemistry:  Lab Results   Component Value Date     07/06/2022    K 3.8 07/06/2022    CHLORIDE 106 07/06/2022    BUN 9.8 07/06/2022    CREATININE 0.72 07/06/2022    GLUCOSE 107 (H) 07/06/2022    CALCIUM 8.9 07/06/2022    ALKPHOS 76 07/06/2022    LABPROT 7.0 07/06/2022    ALBUMIN 3.5 07/06/2022    BILIDIR 0.1 01/18/2022    IBILI 0.20 01/18/2022    AST 23 07/06/2022    ALT 20 07/06/2022        Lab Results   Component Value Date    HGBA1C 5.5 01/18/2022        Hematology:  Lab Results   Component Value Date    WBC 7.6 07/16/2022    HGB 9.0 (L) 07/16/2022    HCT 28.8 (L) 07/16/2022     07/16/2022       Lipid Panel:  Lab Results   Component Value Date     CHOL 207 (H) 01/18/2022    HDL 44 01/18/2022    .00 (H) 01/18/2022    TRIG 88 01/18/2022    TOTALCHOLEST 5 01/18/2022        Urine:  Lab Results   Component Value Date    APPEARANCEUA Clear 01/18/2022    PROTEINUA Trace (A) 01/18/2022    LEUKOCYTESUR 75 (A) 01/18/2022    RBCUA 0-5 01/18/2022    WBCUA 11-20 (A) 01/18/2022    BACTERIA Trace (A) 01/18/2022    SQEPUA 2-20 11/02/2020    HYALINECASTS 0-2 (A) 01/18/2022    CREATRANDUR 197.3 (H) 01/18/2022        Assessment:       ICD-10-CM ICD-9-CM   1. Well adult exam  Z00.00 V70.0   2. Controlled type 2 diabetes mellitus without complication, without long-term current use of insulin  E11.9 250.00   3. Primary hypertension  I10 401.9   4. Hypertriglyceridemia  E78.1 272.1   5. Colon cancer screening  Z12.11 V76.51   6. Skin lesion  L98.9 709.9        Plan:     1. Well adult exam  Labs in 1 month. UTD GYN. MMG in 1 month. Referred to Dr Zamarripa for colonoscopy on 9-2-22.     2. Controlled type 2 diabetes mellitus without complication, without long-term current use of insulin  A1c 5.5 A1c in 1 months. ADA diet and exercise. Cont Metformin as prescribed. DM foot exam done 9-2-22. DM eye exam done 3 months ago with Dr Bobo- will get staff to get copy of DM eye exam. Urine microalbumin 1-18-22- will get in 1 month. Pt UTD with pneumovax.    3. Primary hypertension  Low fat low salt diet and exercise.     4. Hypertriglyceridemia  Low fat diet and exercise.     5. Colon cancer screening  Pt referred to Dr Zamarripa for colonoscopy 9-2-22.     6. Skin lesion of face  Refer to minor surgery cl for eval and mgmt. See pic in chart.          Follow up in about 1 month (around 7/6/2023) for with labs 1 week prior to appt.. In addition to their scheduled follow up, the patient has also been instructed to follow up on as needed basis.     Future Appointments   Date Time Provider Department Center   6/10/2024  9:50 AM FAITH Hodge [facility] [place]  PHYLLIS Ward

## 2023-06-07 LAB — PSYCHE PATHOLOGY RESULT: NORMAL

## 2023-06-12 ENCOUNTER — TELEPHONE (OUTPATIENT)
Dept: GYNECOLOGY | Facility: CLINIC | Age: 53
End: 2023-06-12
Payer: MEDICAID

## 2023-06-12 DIAGNOSIS — Z20.2 EXPOSURE TO TRICHOMONAS: Primary | ICD-10-CM

## 2023-06-12 RX ORDER — METRONIDAZOLE 500 MG/1
500 TABLET ORAL 2 TIMES DAILY
Qty: 14 TABLET | Refills: 0 | Status: SHIPPED | OUTPATIENT
Start: 2023-06-12 | End: 2023-06-19

## 2023-06-12 NOTE — TELEPHONE ENCOUNTER
Pap shows trichomonas which is an STD and needs to be treated. Rx sent to pharmacy on file. Pt and partner need treatment and refrain from intercourse for at least 7 days after they are both treated. If partner does not get treated and they engage in unprotected intercourse, will pass trichomonas on again. Encourage safe sex practices. No alcohol for 72 hours after completion of medication.

## 2023-06-13 NOTE — TELEPHONE ENCOUNTER
Spoke with pt and informed her of this and gave instructions as instructed. She verbalized understanding and has no questions.

## 2023-06-19 ENCOUNTER — PATIENT MESSAGE (OUTPATIENT)
Dept: ADMINISTRATIVE | Facility: HOSPITAL | Age: 53
End: 2023-06-19
Payer: MEDICAID

## 2023-07-12 ENCOUNTER — LAB VISIT (OUTPATIENT)
Dept: LAB | Facility: HOSPITAL | Age: 53
End: 2023-07-12
Attending: NURSE PRACTITIONER
Payer: COMMERCIAL

## 2023-07-12 DIAGNOSIS — E11.9 CONTROLLED TYPE 2 DIABETES MELLITUS WITHOUT COMPLICATION, WITHOUT LONG-TERM CURRENT USE OF INSULIN: ICD-10-CM

## 2023-07-12 LAB
ALBUMIN SERPL-MCNC: 3.9 G/DL (ref 3.5–5)
ALBUMIN/GLOB SERPL: 1 RATIO (ref 1.1–2)
ALP SERPL-CCNC: 93 UNIT/L (ref 40–150)
ALT SERPL-CCNC: 17 UNIT/L (ref 0–55)
AST SERPL-CCNC: 20 UNIT/L (ref 5–34)
BASOPHILS # BLD AUTO: 0.07 X10(3)/MCL
BASOPHILS NFR BLD AUTO: 0.9 %
BILIRUBIN DIRECT+TOT PNL SERPL-MCNC: 0.2 MG/DL
BUN SERPL-MCNC: 11 MG/DL (ref 9.8–20.1)
CALCIUM SERPL-MCNC: 9.4 MG/DL (ref 8.4–10.2)
CHLORIDE SERPL-SCNC: 104 MMOL/L (ref 98–107)
CHOLEST SERPL-MCNC: 206 MG/DL
CHOLEST/HDLC SERPL: 5 {RATIO} (ref 0–5)
CO2 SERPL-SCNC: 27 MMOL/L (ref 22–29)
CREAT SERPL-MCNC: 0.77 MG/DL (ref 0.55–1.02)
EOSINOPHIL # BLD AUTO: 0.16 X10(3)/MCL (ref 0–0.9)
EOSINOPHIL NFR BLD AUTO: 2 %
ERYTHROCYTE [DISTWIDTH] IN BLOOD BY AUTOMATED COUNT: 20 % (ref 11.5–17)
EST. AVERAGE GLUCOSE BLD GHB EST-MCNC: 108.3 MG/DL
GFR SERPLBLD CREATININE-BSD FMLA CKD-EPI: >60 MLS/MIN/1.73/M2
GLOBULIN SER-MCNC: 4.1 GM/DL (ref 2.4–3.5)
GLUCOSE SERPL-MCNC: 81 MG/DL (ref 74–100)
HBA1C MFR BLD: 5.4 %
HCT VFR BLD AUTO: 31.5 % (ref 37–47)
HDLC SERPL-MCNC: 39 MG/DL (ref 35–60)
HGB BLD-MCNC: 9.6 G/DL (ref 12–16)
IMM GRANULOCYTES # BLD AUTO: 0.06 X10(3)/MCL (ref 0–0.04)
IMM GRANULOCYTES NFR BLD AUTO: 0.8 %
LDLC SERPL CALC-MCNC: 141 MG/DL (ref 50–140)
LYMPHOCYTES # BLD AUTO: 2.17 X10(3)/MCL (ref 0.6–4.6)
LYMPHOCYTES NFR BLD AUTO: 27.3 %
MCH RBC QN AUTO: 24.4 PG (ref 27–31)
MCHC RBC AUTO-ENTMCNC: 30.5 G/DL (ref 33–36)
MCV RBC AUTO: 80.2 FL (ref 80–94)
MONOCYTES # BLD AUTO: 0.61 X10(3)/MCL (ref 0.1–1.3)
MONOCYTES NFR BLD AUTO: 7.7 %
NEUTROPHILS # BLD AUTO: 4.87 X10(3)/MCL (ref 2.1–9.2)
NEUTROPHILS NFR BLD AUTO: 61.3 %
NRBC BLD AUTO-RTO: 0 %
PLATELET # BLD AUTO: 259 X10(3)/MCL (ref 130–400)
PLATELETS.RETICULATED NFR BLD AUTO: 10.3 % (ref 0.9–11.2)
PMV BLD AUTO: ABNORMAL FL
POTASSIUM SERPL-SCNC: 3.6 MMOL/L (ref 3.5–5.1)
PROT SERPL-MCNC: 8 GM/DL (ref 6.4–8.3)
RBC # BLD AUTO: 3.93 X10(6)/MCL (ref 4.2–5.4)
SODIUM SERPL-SCNC: 141 MMOL/L (ref 136–145)
T4 FREE SERPL-MCNC: 0.85 NG/DL (ref 0.7–1.48)
TRIGL SERPL-MCNC: 130 MG/DL (ref 37–140)
TSH SERPL-ACNC: 0.89 UIU/ML (ref 0.35–4.94)
VLDLC SERPL CALC-MCNC: 26 MG/DL
WBC # SPEC AUTO: 7.94 X10(3)/MCL (ref 4.5–11.5)

## 2023-07-12 PROCEDURE — 85025 COMPLETE CBC W/AUTO DIFF WBC: CPT

## 2023-07-12 PROCEDURE — 83036 HEMOGLOBIN GLYCOSYLATED A1C: CPT

## 2023-07-12 PROCEDURE — 36415 COLL VENOUS BLD VENIPUNCTURE: CPT

## 2023-07-12 PROCEDURE — 84439 ASSAY OF FREE THYROXINE: CPT

## 2023-07-12 PROCEDURE — 80053 COMPREHEN METABOLIC PANEL: CPT

## 2023-07-12 PROCEDURE — 80061 LIPID PANEL: CPT

## 2023-07-12 PROCEDURE — 84443 ASSAY THYROID STIM HORMONE: CPT

## 2023-07-13 ENCOUNTER — OFFICE VISIT (OUTPATIENT)
Dept: INTERNAL MEDICINE | Facility: CLINIC | Age: 53
End: 2023-07-13
Payer: COMMERCIAL

## 2023-07-13 VITALS
HEIGHT: 61 IN | DIASTOLIC BLOOD PRESSURE: 74 MMHG | WEIGHT: 147 LBS | HEART RATE: 92 BPM | BODY MASS INDEX: 27.75 KG/M2 | SYSTOLIC BLOOD PRESSURE: 107 MMHG | RESPIRATION RATE: 18 BRPM | TEMPERATURE: 98 F

## 2023-07-13 DIAGNOSIS — Z12.31 BREAST CANCER SCREENING BY MAMMOGRAM: ICD-10-CM

## 2023-07-13 DIAGNOSIS — L98.9 SKIN LESION OF FACE: ICD-10-CM

## 2023-07-13 DIAGNOSIS — Z00.00 WELL ADULT EXAM: Primary | ICD-10-CM

## 2023-07-13 DIAGNOSIS — Z12.11 COLON CANCER SCREENING: ICD-10-CM

## 2023-07-13 DIAGNOSIS — E78.1 HYPERTRIGLYCERIDEMIA: ICD-10-CM

## 2023-07-13 DIAGNOSIS — I10 PRIMARY HYPERTENSION: ICD-10-CM

## 2023-07-13 DIAGNOSIS — E11.9 CONTROLLED TYPE 2 DIABETES MELLITUS WITHOUT COMPLICATION, WITHOUT LONG-TERM CURRENT USE OF INSULIN: ICD-10-CM

## 2023-07-13 PROCEDURE — 1159F MED LIST DOCD IN RCRD: CPT | Mod: CPTII,,, | Performed by: NURSE PRACTITIONER

## 2023-07-13 PROCEDURE — 3078F PR MOST RECENT DIASTOLIC BLOOD PRESSURE < 80 MM HG: ICD-10-PCS | Mod: CPTII,,, | Performed by: NURSE PRACTITIONER

## 2023-07-13 PROCEDURE — 3074F PR MOST RECENT SYSTOLIC BLOOD PRESSURE < 130 MM HG: ICD-10-PCS | Mod: CPTII,,, | Performed by: NURSE PRACTITIONER

## 2023-07-13 PROCEDURE — 3044F PR MOST RECENT HEMOGLOBIN A1C LEVEL <7.0%: ICD-10-PCS | Mod: CPTII,,, | Performed by: NURSE PRACTITIONER

## 2023-07-13 PROCEDURE — 99214 OFFICE O/P EST MOD 30 MIN: CPT | Mod: PBBFAC | Performed by: NURSE PRACTITIONER

## 2023-07-13 PROCEDURE — 1160F RVW MEDS BY RX/DR IN RCRD: CPT | Mod: CPTII,,, | Performed by: NURSE PRACTITIONER

## 2023-07-13 PROCEDURE — 1159F PR MEDICATION LIST DOCUMENTED IN MEDICAL RECORD: ICD-10-PCS | Mod: CPTII,,, | Performed by: NURSE PRACTITIONER

## 2023-07-13 PROCEDURE — 3008F PR BODY MASS INDEX (BMI) DOCUMENTED: ICD-10-PCS | Mod: CPTII,,, | Performed by: NURSE PRACTITIONER

## 2023-07-13 PROCEDURE — 3044F HG A1C LEVEL LT 7.0%: CPT | Mod: CPTII,,, | Performed by: NURSE PRACTITIONER

## 2023-07-13 PROCEDURE — 99214 PR OFFICE/OUTPT VISIT, EST, LEVL IV, 30-39 MIN: ICD-10-PCS | Mod: S$PBB,,, | Performed by: NURSE PRACTITIONER

## 2023-07-13 PROCEDURE — 3008F BODY MASS INDEX DOCD: CPT | Mod: CPTII,,, | Performed by: NURSE PRACTITIONER

## 2023-07-13 PROCEDURE — 3078F DIAST BP <80 MM HG: CPT | Mod: CPTII,,, | Performed by: NURSE PRACTITIONER

## 2023-07-13 PROCEDURE — 3074F SYST BP LT 130 MM HG: CPT | Mod: CPTII,,, | Performed by: NURSE PRACTITIONER

## 2023-07-13 PROCEDURE — 99214 OFFICE O/P EST MOD 30 MIN: CPT | Mod: S$PBB,,, | Performed by: NURSE PRACTITIONER

## 2023-07-13 PROCEDURE — 1160F PR REVIEW ALL MEDS BY PRESCRIBER/CLIN PHARMACIST DOCUMENTED: ICD-10-PCS | Mod: CPTII,,, | Performed by: NURSE PRACTITIONER

## 2023-07-13 RX ORDER — FERROUS GLUCONATE 324(38)MG
324 TABLET ORAL
Qty: 30 TABLET | Refills: 11 | Status: SHIPPED | OUTPATIENT
Start: 2023-07-13

## 2023-07-13 RX ORDER — ALBUTEROL SULFATE 90 UG/1
AEROSOL, METERED RESPIRATORY (INHALATION)
Qty: 18 G | Refills: 3 | Status: SHIPPED | OUTPATIENT
Start: 2023-07-13

## 2023-07-13 RX ORDER — HYDROXYZINE HYDROCHLORIDE 25 MG/1
25 TABLET, FILM COATED ORAL 3 TIMES DAILY PRN
Qty: 90 TABLET | Refills: 1 | Status: SHIPPED | OUTPATIENT
Start: 2023-07-13

## 2023-07-13 RX ORDER — PANTOPRAZOLE SODIUM 40 MG/1
40 TABLET, DELAYED RELEASE ORAL DAILY
Qty: 30 TABLET | Refills: 6 | Status: SHIPPED | OUTPATIENT
Start: 2023-07-13

## 2023-07-13 RX ORDER — METFORMIN HYDROCHLORIDE 500 MG/1
500 TABLET ORAL
Qty: 30 TABLET | Refills: 6 | Status: SHIPPED | OUTPATIENT
Start: 2023-07-13

## 2023-07-13 NOTE — PROGRESS NOTES
Patient ID: [id number]     Chief Complaint: LAB RESULTS        HPI:     KELI Vega is a 53 y.o. female here today for a follow up.         ----------------------------  Anemia  Primary hypertension     Past Surgical History:   Procedure Laterality Date     SECTION         Review of patient's allergies indicates:  No Known Allergies    Current Outpatient Medications   Medication Instructions    albuterol (PROVENTIL/VENTOLIN HFA) 90 mcg/actuation inhaler INHALE 2 PUFFS BY MOUTH EVERY 4 HOURS AS NEEDED FOR SHORTNESS OF BREATH OR WHEEZING    aspirin 81 mg, Oral, Daily    blood sugar diagnostic (ACCU-CHEK GUIDE TEST STRIPS) Strp CHECK CBG ONCE DAILY AND KEEP LOG.    dicyclomine (BENTYL) 10 mg, Oral, 3 times daily PRN    ferrous gluconate (FERGON) 324 mg, Oral, With breakfast    hydrOXYzine HCL (ATARAX) 25 mg, Oral, 3 times daily    ibuprofen (ADVIL,MOTRIN) 600 mg, Oral, Every 6 hours PRN    metFORMIN (GLUCOPHAGE) 500 mg, Oral, With breakfast    pantoprazole (PROTONIX) 40 mg, Oral, Daily       Social History     Socioeconomic History    Marital status: Unknown   Tobacco Use    Smoking status: Never    Smokeless tobacco: Never   Substance and Sexual Activity    Alcohol use: Yes    Drug use: Never    Sexual activity: Not Currently     Social Determinants of Health     Financial Resource Strain: Low Risk     Difficulty of Paying Living Expenses: Not hard at all   Food Insecurity: No Food Insecurity    Worried About Running Out of Food in the Last Year: Never true    Ran Out of Food in the Last Year: Never true   Transportation Needs: No Transportation Needs    Lack of Transportation (Medical): No    Lack of Transportation (Non-Medical): No   Physical Activity: Sufficiently Active    Days of Exercise per Week: 7 days    Minutes of Exercise per Session: 30 min   Social Connections: Moderately Isolated    Frequency of Communication with Friends and Family: More than three times a  week    Frequency of Social Gatherings with Friends and Family: Once a week    Attends [employer] Services: Never    Active Member of Clubs or Organizations: No    Attends Club or Organization Meetings: Never    Marital Status:    Housing Stability: Low Risk     Unable to Pay for Housing in the Last Year: No    Number of Places Lived in the Last Year: 1    Unstable Housing in the Last Year: No        Family History   Problem Relation Age of Onset    Arthritis Mother     Alzheimer's disease Mother         Patient Care Team:  PHYLLIS Stevenson as PCP - General (Family Medicine)     Subjective:     Review of Systems     See HPI for details    Constitutional: Denies Change in appetite. Denies Chills. Denies Fever. Denies Night sweats.  Eye: Denies Blurred vision. Denies Discharge. Denies Eye pain.  ENT: Denies Decreased hearing. Denies Sore throat. Denies Swollen glands.  Respiratory: Denies Cough. Denies Shortness of breath. Denies Shortness of breath with exertion. Denies Wheezing.  Cardiovascular: DeniesChest pain at rest. Denies Chest pain with exertion. Denies Irregular heartbeat. Denies Palpitations. Denies Edema.  Gastrointestinal: Denies Abdominal pain. DeniesDiarrhea. Denies Nausea. Denies Vomiting. Denies Hematemesis or Hematochezia.  Genitourinary: Denies Dysuria. Denies Urinary frequency. Denies Urinary urgency. Denies Blood in urine.  Endocrine: Denies Cold intolerance. Denies Excessive thirst. Denies Heat intolerance. Denies Weight loss. Denies Weight gain.  Musculoskeletal: Denies Painful joints. Denies Weakness.  Integumentary: Denies Rash. Denies Itching. Denies Dry skin.  Neurologic: Denies Dizziness. Denies Fainting. Denies Headache.  Psychiatric: Denies Depression. Denies Anxiety. Denies Suicidal/Homicidal ideations.    All Other ROS: Negative except as stated in HPI.       Objective:     Visit Vitals  /74 (BP Location: Right arm, Patient Position: Sitting, BP Method:  "Medium (Automatic))   Pulse 92   Temp 98.4 °F (36.9 °C) (Oral)   Resp 18   Ht 5' 1" (1.549 m)   Wt 66.7 kg (147 lb)   BMI 27.78 kg/m²       Physical Exam    General: Alert and oriented, No acute distress.  Head: Normocephalic, Atraumatic.  Eye: Pupils are equal, round and reactive to light, Extraocular movements are intact, Sclera non-icteric.  Ears/Nose/Throat: Normal, Mucosa moist,Clear.  Neck/Thyroid: Supple, Non-tender, No carotid bruit, No lymphadenopathy, No JVD, Full range of motion.  Respiratory: Clear to auscultation bilaterally; No wheezes, rales or rhonchi,Non-labored respirations, Symmetrical chest wall expansion.  Cardiovascular: Regular rate and rhythm, S1/S2 normal, No murmurs, rubs or gallops.  Gastrointestinal: Soft, Non-tender, Non-distended, Normal bowel sounds, No palpable organomegaly.  Musculoskeletal: Normal range of motion.  Integumentary: Warm, Dry, Intact, No suspicious lesions or rashes.  Extremities: No clubbing, cyanosis or edema  Neurologic: No focal deficits, Cranial Nerves II-XII are grossly intact, Motor strength normal upper and lower extremities, Sensory exam intact.  Psychiatric: Normal interaction, Coherent speech, Euthymic mood, Appropriate affect       Labs Reviewed:     Chemistry:  Lab Results   Component Value Date     07/12/2023    K 3.6 07/12/2023    CHLORIDE 104 07/12/2023    BUN 11.0 07/12/2023    CREATININE 0.77 07/12/2023    EGFRNORACEVR >60 07/12/2023    GLUCOSE 81 07/12/2023    CALCIUM 9.4 07/12/2023    ALKPHOS 93 07/12/2023    LABPROT 8.0 07/12/2023    ALBUMIN 3.9 07/12/2023    BILIDIR 0.1 01/18/2022    IBILI 0.20 01/18/2022    AST 20 07/12/2023    ALT 17 07/12/2023        Lab Results   Component Value Date    HGBA1C 5.4 07/12/2023        Hematology:  Lab Results   Component Value Date    WBC 7.94 07/12/2023    HGB 9.6 (L) 07/12/2023    HCT 31.5 (L) 07/12/2023     07/12/2023       Lipid Panel:  Lab Results   Component Value Date    CHOL 206 (H) " 07/12/2023    HDL 39 07/12/2023    .00 (H) 07/12/2023    TRIG 130 07/12/2023    TOTALCHOLEST 5 07/12/2023        Urine:  Lab Results   Component Value Date    COLORUA Yellow 07/13/2023    APPEARANCEUA Turbid (A) 07/13/2023    SGUA 1.034 07/13/2023    PHUA 5.5 07/13/2023    PROTEINUA Trace (A) 07/13/2023    GLUCOSEUA Normal 07/13/2023    KETONESUA Negative 07/13/2023    BLOODUA Trace (A) 07/13/2023    NITRITESUA 2+ (A) 07/13/2023    LEUKOCYTESUR 250 (A) 07/13/2023    RBCUA 0-5 07/13/2023    WBCUA 11-20 (A) 07/13/2023    BACTERIA Moderate (A) 07/13/2023    SQEPUA Few (A) 07/13/2023    HYALINECASTS None Seen 07/13/2023    CREATRANDUR 197.3 (H) 01/18/2022        Assessment:       ICD-10-CM ICD-9-CM   1. Well adult exam  Z00.00 V70.0   2. Controlled type 2 diabetes mellitus without complication, without long-term current use of insulin  E11.9 250.00   3. Primary hypertension  I10 401.9   4. Hypertriglyceridemia  E78.1 272.1   5. Colon cancer screening  Z12.11 V76.51   6. Skin lesion of face  L98.9 709.9   7. Breast cancer screening by mammogram  Z12.31 V76.12        Plan:     1. Well adult exam  Labs in 3 months. UTD GYN. MMG in 1 month. Pt referred to Dr Zamarripa 9-2-22- get records.    2. Controlled type 2 diabetes mellitus without complication, without long-term current use of insulin  A1c 5.4 A1c in 6 months. ADA diet and exercise. Cont Metformin as prescribed. DM foot exam done 9-2-22. DM eye exam done 3 months ago with Dr Bobo- will get staff to get copy of DM eye exam. Urine microalbumin 1-18-22- will get today. Pt UTD with pneumovax.    3. Primary hypertension  Low fat low salt diet and exercise.     4. Hypertriglyceridemia  Low fat diet and exercise.     5. Colon cancer screening  Pt referred to Dr Zamarripa for colonoscopy 9-2-22. Pt states she never received an appt. Pt has been having issues with her cell phone. Re-refer to Dr Zamarripa for screening colonoscopy.     6. Skin lesion of face  Refer to minor  surgery cl for eval and mgmt. See pic in chart. Not in chart states unable to contact pt.    7. Breast cancer screening by mammogram  MMG in 1 month.         Follow up in about 3 months (around 10/13/2023) for with labs 1 week prior to appt. In addition to their scheduled follow up, the patient has also been instructed to follow up on as needed basis.     Future Appointments   Date Time Provider Department Center   6/10/2024  9:50 AM Joann Briones, FAITH [facility] [place]        Tabitha Ward, PHYLLIS

## 2023-07-15 DIAGNOSIS — N39.0 E. COLI UTI: Primary | ICD-10-CM

## 2023-07-15 DIAGNOSIS — B96.20 E. COLI UTI: Primary | ICD-10-CM

## 2023-07-15 RX ORDER — SULFAMETHOXAZOLE AND TRIMETHOPRIM 800; 160 MG/1; MG/1
1 TABLET ORAL 2 TIMES DAILY
Qty: 14 TABLET | Refills: 0 | Status: SHIPPED | OUTPATIENT
Start: 2023-07-15 | End: 2023-07-22

## 2023-07-31 ENCOUNTER — HOSPITAL ENCOUNTER (OUTPATIENT)
Dept: RADIOLOGY | Facility: HOSPITAL | Age: 53
Discharge: HOME | End: 2023-07-31
Attending: NURSE PRACTITIONER
Payer: COMMERCIAL

## 2023-07-31 DIAGNOSIS — Z12.31 BREAST CANCER SCREENING BY MAMMOGRAM: ICD-10-CM

## 2023-07-31 PROCEDURE — 77067 SCR MAMMO BI INCL CAD: CPT | Mod: 26,,, | Performed by: RADIOLOGY

## 2023-07-31 PROCEDURE — 77067 MAMMO DIGITAL SCREENING BILAT WITH TOMO: ICD-10-PCS | Mod: 26,,, | Performed by: RADIOLOGY

## 2023-07-31 PROCEDURE — 77063 BREAST TOMOSYNTHESIS BI: CPT | Mod: 26,,, | Performed by: RADIOLOGY

## 2023-07-31 PROCEDURE — 77063 MAMMO DIGITAL SCREENING BILAT WITH TOMO: ICD-10-PCS | Mod: 26,,, | Performed by: RADIOLOGY

## 2023-07-31 PROCEDURE — 77067 SCR MAMMO BI INCL CAD: CPT | Mod: TC

## 2023-09-01 ENCOUNTER — PATIENT MESSAGE (OUTPATIENT)
Dept: ADMINISTRATIVE | Facility: HOSPITAL | Age: 53
End: 2023-09-01
Payer: MEDICAID

## 2023-09-11 PROBLEM — Z00.00 WELL ADULT EXAM: Status: RESOLVED | Noted: 2022-07-20 | Resolved: 2023-09-11

## 2024-01-31 ENCOUNTER — HOSPITAL ENCOUNTER (EMERGENCY)
Facility: HOSPITAL | Age: 54
Discharge: HOME | End: 2024-01-31
Attending: STUDENT IN AN ORGANIZED HEALTH CARE EDUCATION/TRAINING PROGRAM
Payer: COMMERCIAL

## 2024-01-31 VITALS
DIASTOLIC BLOOD PRESSURE: 77 MMHG | BODY MASS INDEX: 29.45 KG/M2 | RESPIRATION RATE: 18 BRPM | WEIGHT: 150 LBS | OXYGEN SATURATION: 98 % | HEIGHT: 60 IN | HEART RATE: 80 BPM | SYSTOLIC BLOOD PRESSURE: 113 MMHG | TEMPERATURE: 99 F

## 2024-01-31 DIAGNOSIS — K62.89 PROCTITIS: ICD-10-CM

## 2024-01-31 DIAGNOSIS — K59.00 CONSTIPATION, UNSPECIFIED CONSTIPATION TYPE: Primary | ICD-10-CM

## 2024-01-31 DIAGNOSIS — N39.0 URINARY TRACT INFECTION WITHOUT HEMATURIA, SITE UNSPECIFIED: ICD-10-CM

## 2024-01-31 LAB
ALBUMIN SERPL-MCNC: 3.9 G/DL (ref 3.5–5)
ALBUMIN/GLOB SERPL: 1 RATIO (ref 1.1–2)
ALP SERPL-CCNC: 86 UNIT/L (ref 40–150)
ALT SERPL-CCNC: 35 UNIT/L (ref 0–55)
APPEARANCE UR: ABNORMAL
AST SERPL-CCNC: 25 UNIT/L (ref 5–34)
B-HCG SERPL QL: NEGATIVE
BACTERIA #/AREA URNS AUTO: ABNORMAL /HPF
BASOPHILS # BLD AUTO: 0.05 X10(3)/MCL
BASOPHILS NFR BLD AUTO: 0.7 %
BILIRUB SERPL-MCNC: 0.2 MG/DL
BILIRUB UR QL STRIP.AUTO: NEGATIVE
BUN SERPL-MCNC: 14.5 MG/DL (ref 9.8–20.1)
CALCIUM SERPL-MCNC: 9.6 MG/DL (ref 8.4–10.2)
CHLORIDE SERPL-SCNC: 106 MMOL/L (ref 98–107)
CO2 SERPL-SCNC: 26 MMOL/L (ref 22–29)
COLOR UR AUTO: YELLOW
CREAT SERPL-MCNC: 0.91 MG/DL (ref 0.55–1.02)
EOSINOPHIL # BLD AUTO: 0.13 X10(3)/MCL (ref 0–0.9)
EOSINOPHIL NFR BLD AUTO: 1.9 %
ERYTHROCYTE [DISTWIDTH] IN BLOOD BY AUTOMATED COUNT: 15.5 % (ref 11.5–17)
GFR SERPLBLD CREATININE-BSD FMLA CKD-EPI: >60 MLS/MIN/1.73/M2
GLOBULIN SER-MCNC: 3.9 GM/DL (ref 2.4–3.5)
GLUCOSE SERPL-MCNC: 117 MG/DL (ref 74–100)
GLUCOSE UR QL STRIP.AUTO: NEGATIVE
HCT VFR BLD AUTO: 38.5 % (ref 37–47)
HGB BLD-MCNC: 12.3 G/DL (ref 12–16)
IMM GRANULOCYTES # BLD AUTO: 0.03 X10(3)/MCL (ref 0–0.04)
IMM GRANULOCYTES NFR BLD AUTO: 0.4 %
KETONES UR QL STRIP.AUTO: NEGATIVE
LEUKOCYTE ESTERASE UR QL STRIP.AUTO: ABNORMAL
LIPASE SERPL-CCNC: 30 U/L
LYMPHOCYTES # BLD AUTO: 2.01 X10(3)/MCL (ref 0.6–4.6)
LYMPHOCYTES NFR BLD AUTO: 29.7 %
MCH RBC QN AUTO: 26.7 PG (ref 27–31)
MCHC RBC AUTO-ENTMCNC: 31.9 G/DL (ref 33–36)
MCV RBC AUTO: 83.5 FL (ref 80–94)
MONOCYTES # BLD AUTO: 0.61 X10(3)/MCL (ref 0.1–1.3)
MONOCYTES NFR BLD AUTO: 9 %
NEUTROPHILS # BLD AUTO: 3.93 X10(3)/MCL (ref 2.1–9.2)
NEUTROPHILS NFR BLD AUTO: 58.3 %
NITRITE UR QL STRIP.AUTO: POSITIVE
NRBC BLD AUTO-RTO: 0 %
PH UR STRIP.AUTO: 6 [PH]
PLATELET # BLD AUTO: 267 X10(3)/MCL (ref 130–400)
PMV BLD AUTO: 12.7 FL (ref 7.4–10.4)
POTASSIUM SERPL-SCNC: 4.1 MMOL/L (ref 3.5–5.1)
PROT SERPL-MCNC: 7.8 GM/DL (ref 6.4–8.3)
PROT UR QL STRIP.AUTO: NEGATIVE
RBC # BLD AUTO: 4.61 X10(6)/MCL (ref 4.2–5.4)
RBC #/AREA URNS AUTO: ABNORMAL /HPF
RBC UR QL AUTO: ABNORMAL
SODIUM SERPL-SCNC: 140 MMOL/L (ref 136–145)
SP GR UR STRIP.AUTO: 1.02 (ref 1–1.03)
SQUAMOUS #/AREA URNS AUTO: ABNORMAL /HPF
UROBILINOGEN UR STRIP-ACNC: 0.2
WBC # SPEC AUTO: 6.76 X10(3)/MCL (ref 4.5–11.5)
WBC #/AREA URNS AUTO: ABNORMAL /HPF

## 2024-01-31 PROCEDURE — 81003 URINALYSIS AUTO W/O SCOPE: CPT

## 2024-01-31 PROCEDURE — 80053 COMPREHEN METABOLIC PANEL: CPT

## 2024-01-31 PROCEDURE — 96361 HYDRATE IV INFUSION ADD-ON: CPT

## 2024-01-31 PROCEDURE — 81025 URINE PREGNANCY TEST: CPT

## 2024-01-31 PROCEDURE — 96374 THER/PROPH/DIAG INJ IV PUSH: CPT

## 2024-01-31 PROCEDURE — 96375 TX/PRO/DX INJ NEW DRUG ADDON: CPT

## 2024-01-31 PROCEDURE — 99285 EMERGENCY DEPT VISIT HI MDM: CPT | Mod: 25

## 2024-01-31 PROCEDURE — 25000003 PHARM REV CODE 250

## 2024-01-31 PROCEDURE — 25500020 PHARM REV CODE 255

## 2024-01-31 PROCEDURE — 85025 COMPLETE CBC W/AUTO DIFF WBC: CPT

## 2024-01-31 PROCEDURE — 83690 ASSAY OF LIPASE: CPT

## 2024-01-31 PROCEDURE — 63600175 PHARM REV CODE 636 W HCPCS

## 2024-01-31 RX ORDER — CIPROFLOXACIN 500 MG/1
500 TABLET ORAL 2 TIMES DAILY
Qty: 14 TABLET | Refills: 0 | Status: SHIPPED | OUTPATIENT
Start: 2024-01-31 | End: 2024-02-07

## 2024-01-31 RX ORDER — METRONIDAZOLE 500 MG/1
500 TABLET ORAL 3 TIMES DAILY
Qty: 21 TABLET | Refills: 0 | Status: SHIPPED | OUTPATIENT
Start: 2024-01-31 | End: 2024-02-07

## 2024-01-31 RX ORDER — KETOROLAC TROMETHAMINE 30 MG/ML
30 INJECTION, SOLUTION INTRAMUSCULAR; INTRAVENOUS
Status: COMPLETED | OUTPATIENT
Start: 2024-01-31 | End: 2024-01-31

## 2024-01-31 RX ORDER — LACTULOSE 10 G/15ML
15 SOLUTION ORAL 3 TIMES DAILY PRN
Qty: 150 ML | Refills: 0 | Status: SHIPPED | OUTPATIENT
Start: 2024-01-31

## 2024-01-31 RX ORDER — ONDANSETRON HYDROCHLORIDE 2 MG/ML
4 INJECTION, SOLUTION INTRAVENOUS
Status: COMPLETED | OUTPATIENT
Start: 2024-01-31 | End: 2024-01-31

## 2024-01-31 RX ADMIN — SODIUM CHLORIDE, POTASSIUM CHLORIDE, SODIUM LACTATE AND CALCIUM CHLORIDE 1000 ML: 600; 310; 30; 20 INJECTION, SOLUTION INTRAVENOUS at 01:01

## 2024-01-31 RX ADMIN — ONDANSETRON 4 MG: 2 INJECTION INTRAMUSCULAR; INTRAVENOUS at 01:01

## 2024-01-31 RX ADMIN — KETOROLAC TROMETHAMINE 30 MG: 30 INJECTION, SOLUTION INTRAMUSCULAR; INTRAVENOUS at 03:01

## 2024-01-31 RX ADMIN — CEFTRIAXONE SODIUM 1 G: 1 INJECTION, POWDER, FOR SOLUTION INTRAMUSCULAR; INTRAVENOUS at 04:01

## 2024-01-31 RX ADMIN — IOPAMIDOL 100 ML: 755 INJECTION, SOLUTION INTRAVENOUS at 03:01

## 2024-01-31 NOTE — ED PROVIDER NOTES
Encounter Date: 2024       History     Chief Complaint   Patient presents with    Abdominal Pain     Pt complaint of abd pain     The patient is a 54 y.o. female with a history of hypertension and diabetes who presents to the Emergency Department with a chief complaint of generalized abdominal pain. Symptoms began 1 week ago and have been constant since onset. Her pain is currently rated as a 6/10 in severity and described as aching with no radiation. Associated symptoms include nausea and constipation. Symptoms are aggravated with nothing and there are no alleviating factors. The patient denies vomiting, diarrhea, fever, or chills. She reports taking nothing prior to arrival with no relief of symptoms. No other reported symptoms at this time.      The history is provided by the patient. No  was used.   Abdominal Pain  The current episode started several days ago. The onset of the illness was gradual. The problem has not changed since onset.The abdominal pain is generalized. The abdominal pain does not radiate. The severity of the abdominal pain is 6/10. The abdominal pain is relieved by nothing. The other symptoms of the illness include nausea. The other symptoms of the illness do not include fever, shortness of breath, vomiting, diarrhea or dysuria.   Nausea began 3 to 5 days ago.   The patient states that she believes she is currently not pregnant. The patient has had a change in bowel habit. Additional symptoms associated with the illness include constipation. Symptoms associated with the illness do not include urgency, frequency or back pain.     Review of patient's allergies indicates:  No Known Allergies  Past Medical History:   Diagnosis Date    Anemia     Primary hypertension 2023     Past Surgical History:   Procedure Laterality Date     SECTION       Family History   Problem Relation Age of Onset    Arthritis Mother     Alzheimer's disease Mother      Social History      Tobacco Use    Smoking status: Never    Smokeless tobacco: Never   Substance Use Topics    Alcohol use: Yes    Drug use: Never     Review of Systems   Constitutional:  Negative for fever.   HENT:  Negative for sore throat.    Respiratory:  Negative for shortness of breath.    Cardiovascular:  Negative for chest pain.   Gastrointestinal:  Positive for abdominal pain, constipation and nausea. Negative for diarrhea and vomiting.   Genitourinary:  Negative for dysuria, frequency and urgency.   Musculoskeletal:  Negative for back pain.   Skin:  Negative for rash.   Neurological:  Negative for weakness.   Hematological:  Does not bruise/bleed easily.   All other systems reviewed and are negative.      Physical Exam     Initial Vitals [01/31/24 1319]   BP Pulse Resp Temp SpO2   113/77 80 18 98.6 °F (37 °C) 98 %      MAP       --         Physical Exam    Nursing note and vitals reviewed.  Constitutional: She appears well-developed and well-nourished.   HENT:   Head: Normocephalic.   Right Ear: Hearing and tympanic membrane normal.   Left Ear: Hearing and tympanic membrane normal.   Mouth/Throat: Uvula is midline, oropharynx is clear and moist and mucous membranes are normal.   Eyes: Conjunctivae and EOM are normal. Pupils are equal, round, and reactive to light.   Cardiovascular:  Normal rate, regular rhythm, normal heart sounds and normal pulses.           Pulmonary/Chest: Effort normal and breath sounds normal.   Abdominal: Abdomen is soft. Bowel sounds are normal. There is no abdominal tenderness.     Lymphadenopathy:     She has no cervical adenopathy.   Neurological: She is alert. GCS eye subscore is 4. GCS verbal subscore is 5. GCS motor subscore is 6.   Skin: Skin is warm, dry and intact. Capillary refill takes less than 2 seconds.         ED Course   Procedures  Labs Reviewed   COMPREHENSIVE METABOLIC PANEL - Abnormal; Notable for the following components:       Result Value    Glucose Level 117 (*)     Globulin  3.9 (*)     Albumin/Globulin Ratio 1.0 (*)     All other components within normal limits   URINALYSIS, REFLEX TO URINE CULTURE - Abnormal; Notable for the following components:    Appearance, UA SL CLOUDY (*)     Blood, UA Trace (*)     Nitrites, UA Positive (*)     Leukocyte Esterase, UA Trace (*)     All other components within normal limits   CBC WITH DIFFERENTIAL - Abnormal; Notable for the following components:    MCH 26.7 (*)     MCHC 31.9 (*)     MPV 12.7 (*)     All other components within normal limits   URINALYSIS, MICROSCOPIC - Abnormal; Notable for the following components:    Bacteria, UA Many (*)     Squamous Epithelial Cells, UA Many (*)     All other components within normal limits   LIPASE - Normal   PREGNANCY TEST, URINE RAPID - Normal   CBC W/ AUTO DIFFERENTIAL    Narrative:     The following orders were created for panel order CBC auto differential.  Procedure                               Abnormality         Status                     ---------                               -----------         ------                     CBC with Differential[[id number]]       Abnormal            Final result                 Please view results for these tests on the individual orders.          Imaging Results              CT Abdomen Pelvis With IV Contrast NO Oral Contrast (Final result)  Result time 01/31/24 15:48:42      Final result by Reece Lofton MD (01/31/24 15:48:42)                   Impression:      Constipation with mild proctitis.      Electronically signed by: Reece Lofton  Date:    01/31/2024  Time:    15:48               Narrative:    EXAMINATION:  CT ABDOMEN PELVIS WITH IV CONTRAST    CLINICAL HISTORY:  Abdominal pain, acute, nonlocalized;    TECHNIQUE:  CT imaging of the abdomen and pelvis after intravenous contrast. Dose length product 316 mGycm. Automatic exposure control, adjustment of mA/kV or iterative reconstruction technique used to limit radiation dose.    COMPARISON:  Chest CT  07/06/2022    FINDINGS:  Liver/biliary: Normal liver.  No radiodense gallstones. No intra or extrahepatic biliary ductal dilation.    Pancreas: Normal.    Spleen: Normal.    Adrenals: Normal.    Genitourinary: No hydronephrosis.  24 mm left renal cyst.  Bladder within normal limits. Multiple fibroids.    Stomach/bowel: Large volume stool through the colon and rectum with mild perirectal inflammation.  No small bowel dilatation.  Normal appendix.    Lymph nodes/peritoneum: No pathologically enlarged lymph node identified. No ascites or free air. No fluid collection.    Vasculature: Normal abdominal aortic caliber.    Abdominal wall: Normal.    Lung bases: No consolidation or pleural effusion.    Musculoskeletal: No acute osseous findings.                                       Medications   cefTRIAXone (Rocephin) 1 g in dextrose 5 % in water (D5W) 100 mL IVPB (MB+) (has no administration in time range)   lactated ringers bolus 1,000 mL (0 mLs Intravenous Stopped 1/31/24 1453)   ondansetron injection 4 mg (4 mg Intravenous Given 1/31/24 1352)   iopamidoL (ISOVUE-370) injection 100 mL (100 mLs Intravenous Given 1/31/24 1515)   ketorolac injection 30 mg (30 mg Intravenous Given 1/31/24 1555)     Medical Decision Making  The patient is a 54 y.o. female with a history of hypertension and diabetes who presents to the Emergency Department with a chief complaint of generalized abdominal pain. Symptoms began 1 week ago and have been constant since onset. Her pain is currently rated as a 6/10 in severity and described as aching with no radiation. Associated symptoms include nausea and constipation. Symptoms are aggravated with nothing and there are no alleviating factors. The patient denies vomiting, diarrhea, fever, or chills. She reports taking nothing prior to arrival with no relief of symptoms. No other reported symptoms at this time.      Differential diagnosis include but are not limited to constipation, urinary tract  infection, gastroenteritis, diverticulitis, colitis, SBO     Problems Addressed:  Constipation, unspecified constipation type: acute illness or injury  Proctitis: acute illness or injury  Urinary tract infection without hematuria, site unspecified: acute illness or injury    Amount and/or Complexity of Data Reviewed  Labs: ordered. Decision-making details documented in ED Course.  Radiology: ordered. Decision-making details documented in ED Course.    Risk  Prescription drug management.               ED Course as of 01/31/24 1610   Wed Jan 31, 2024   1458 NITRITE UA(!): Positive [LM]   1458 Leukocytes, UA(!): Trace [LM]   1511 Bacteria, UA(!): Many [LM]   1555 CT Abdomen Pelvis With IV Contrast NO Oral Contrast  Impression:     Constipation with mild proctitis.   [LM]   1608 I discussed results in detail with patient including follow up. She is amendable to plan and ready for discharge home.  [LM]      ED Course User Index  [LM] Omi Powers NP                           Clinical Impression:  Final diagnoses:  [K59.00] Constipation, unspecified constipation type (Primary)  [N39.0] Urinary tract infection without hematuria, site unspecified  [K62.89] Proctitis          ED Disposition Condition    Discharge Stable          ED Prescriptions       Medication Sig Dispense Start Date End Date Auth. Provider    ciprofloxacin HCl (CIPRO) 500 MG tablet Take 1 tablet (500 mg total) by mouth 2 (two) times daily. for 7 days 14 tablet 1/31/2024 2/7/2024 Omi Powers NP    metroNIDAZOLE (FLAGYL) 500 MG tablet Take 1 tablet (500 mg total) by mouth 3 (three) times daily. for 7 days 21 tablet 1/31/2024 2/7/2024 Omi Powers NP    lactulose (CHRONULAC) 20 gram/30 mL Soln Take 23 mLs (15 g total) by mouth 3 (three) times daily as needed (Constipation). 150 mL 1/31/2024 -- Omi Powers NP          Follow-up Information       Follow up With Specialties Details Why Contact Tabitha Hampton, FNP Family  Medicine Schedule an appointment as soon as possible for a visit   [address] [facility] [address]  [phone]               Omi Powers, GABRIEL  01/31/24 6405

## 2024-01-31 NOTE — Clinical Note
"Sandra Floresnazanin Vega was seen and treated in our emergency department on 1/31/2024.  She may return to work on 02/01/2024.       If you have any questions or concerns, please don't hesitate to call.      CLIFFORD escobedo    "

## 2024-06-10 ENCOUNTER — OFFICE VISIT (OUTPATIENT)
Dept: GYNECOLOGY | Facility: CLINIC | Age: 54
End: 2024-06-10
Payer: COMMERCIAL

## 2024-06-10 VITALS
SYSTOLIC BLOOD PRESSURE: 127 MMHG | OXYGEN SATURATION: 100 % | HEART RATE: 91 BPM | HEIGHT: 60 IN | WEIGHT: 155.63 LBS | RESPIRATION RATE: 20 BRPM | BODY MASS INDEX: 30.55 KG/M2 | TEMPERATURE: 99 F | DIASTOLIC BLOOD PRESSURE: 82 MMHG

## 2024-06-10 DIAGNOSIS — Z11.3 ROUTINE SCREENING FOR STI (SEXUALLY TRANSMITTED INFECTION): ICD-10-CM

## 2024-06-10 DIAGNOSIS — Z12.31 VISIT FOR SCREENING MAMMOGRAM: ICD-10-CM

## 2024-06-10 DIAGNOSIS — Z12.11 COLON CANCER SCREENING: ICD-10-CM

## 2024-06-10 DIAGNOSIS — Z01.419 ENCOUNTER FOR ANNUAL ROUTINE GYNECOLOGICAL EXAMINATION: Primary | ICD-10-CM

## 2024-06-10 LAB
C TRACH DNA SPEC QL NAA+PROBE: NOT DETECTED
CLUE CELLS VAG QL WET PREP: NORMAL
N GONORRHOEA DNA SPEC QL NAA+PROBE: NOT DETECTED
SOURCE (OHS): NORMAL
T VAGINALIS VAG QL WET PREP: NORMAL
WBC #/AREA VAG WET PREP: NORMAL
YEAST SPEC QL WET PREP: NORMAL

## 2024-06-10 PROCEDURE — 87491 CHLMYD TRACH DNA AMP PROBE: CPT | Performed by: NURSE PRACTITIONER

## 2024-06-10 PROCEDURE — 99215 OFFICE O/P EST HI 40 MIN: CPT | Mod: PBBFAC | Performed by: NURSE PRACTITIONER

## 2024-06-10 PROCEDURE — 87210 SMEAR WET MOUNT SALINE/INK: CPT | Performed by: NURSE PRACTITIONER

## 2024-06-10 PROCEDURE — 87591 N.GONORRHOEAE DNA AMP PROB: CPT | Performed by: NURSE PRACTITIONER

## 2024-06-10 PROCEDURE — 99396 PREV VISIT EST AGE 40-64: CPT | Mod: S$PBB,,, | Performed by: NURSE PRACTITIONER

## 2024-06-10 NOTE — PROGRESS NOTES
[facility] -  Gynecology / Women's Health Clinic      Subjective:       Patient ID: Sandra Vega is a 54 y.o. female.    Chief Complaint:  Gynecologic Exam    History of Present Illness  The patient  here for annual exam. Pt is perimenopausal, cycles occur every other month per pt. Her LMP was 24. Period last 7 days and changes pads 3x/day. Denies history of abnormal paps. Last pap  NIL and HPV neg. MG-23 & BIRADS 1. Denies breast or urinary complaints. Denies pelvic pain, abnormal bleeding or discharge. Pt reports no STIs in the past and no concerns. Pt states she is currently not sexually active. Denies tobacco use. Dep. screening 0. Denies fly hx of breast, ovarian, uterine or colon cancer.      GYN & OB History  Patient's last menstrual period was 2024.   Date of Last Pap: 2023    Review of patient's allergies indicates:  No Known Allergies  Past Medical History:   Diagnosis Date    Anemia     Anxiety disorder, unspecified     Primary hypertension 2023     OB History    Para Term  AB Living   3 2           SAB IAB Ectopic Multiple Live Births                  # Outcome Date GA Lbr Pedro/2nd Weight Sex Type Anes PTL Lv   3             2 Para            1 Para                 Review of Systems  Review of Systems    Negative except for pertinent findings for positives per HPI     Objective:    Physical Exam    /82 (BP Location: Right arm, Patient Position: Sitting, BP Method: Medium (Automatic))   Pulse 91   Temp 98.7 °F (37.1 °C) (Oral)   Resp 20   Ht 5' (1.524 m)   Wt 70.6 kg (155 lb 9.6 oz)   LMP 2024 Comment: skip months  SpO2 100%   BMI 30.39 kg/m²   GENERAL: Well-developed female. No acute distress.    SKIN: Normal to inspection, warm and intact.  BREASTS: No rashes or erythema. No masses, lumps, discharge, tenderness.  VULVA: General appearance normal; external genitalia with no lesions or erythema.  VAGINA:  Mucosa/vaginal vault pink, no abnormal discharge or lesions.  CERVIX: Pink, parous appearing os, high and anterior in vaginal vault, no erythema or abnormal discharge.  BIMANUAL EXAM: reveals a 10 week-sized uterus. The uterus is regular, mobile, and non tender. Ab adnexa reveal no tenderness.  PSYCHIATRIC: Patient is oriented to person, place, and time. Mood and affect are normal.    Assessment:         ICD-10-CM ICD-9-CM   1. Encounter for annual routine gynecological examination  Z01.419 V72.31   2. Routine screening for STI (sexually transmitted infection)  Z11.3 V74.5   3. Visit for screening mammogram  Z12.31 V76.12   4. Colon cancer screening  Z12.11 V76.51     Plan:   Sandra was seen today for gynecologic exam.    Diagnoses and all orders for this visit:    Encounter for annual routine gynecological examination    Routine screening for STI (sexually transmitted infection)  -     Chlamydia/GC, PCR  -     Wet Prep, Genital  -     HIV 1/2 Ag/Ab (4th Gen); Future  -     Hepatitis C Antibody; Future  -     Hepatitis B Surface Antigen; Future  -     SYPHILIS ANTIBODY (WITH REFLEX RPR); Future    Visit for screening mammogram  -     Mammo Digital Screening Bilat w/ Angel; Future    Colon cancer screening  -     Ambulatory referral/consult to Gastroenterology; Future    Pelvic today, pap utd per ACOG  STI screen  MG ordered  Colonoscopy referral  Follow up in about 1 year (around 6/10/2025) for annual exam.

## 2024-06-19 ENCOUNTER — TELEPHONE (OUTPATIENT)
Dept: INTERNAL MEDICINE | Facility: CLINIC | Age: 54
End: 2024-06-19
Payer: COMMERCIAL

## 2024-06-20 DIAGNOSIS — E11.9 CONTROLLED TYPE 2 DIABETES MELLITUS WITHOUT COMPLICATION, WITHOUT LONG-TERM CURRENT USE OF INSULIN: Primary | ICD-10-CM

## 2024-06-20 DIAGNOSIS — E78.2 MIXED HYPERLIPIDEMIA: ICD-10-CM

## 2024-06-20 DIAGNOSIS — Z12.11 COLON CANCER SCREENING: ICD-10-CM

## 2024-10-09 ENCOUNTER — HOSPITAL ENCOUNTER (EMERGENCY)
Facility: HOSPITAL | Age: 54
Discharge: HOME | End: 2024-10-09
Attending: INTERNAL MEDICINE
Payer: COMMERCIAL

## 2024-10-09 VITALS
BODY MASS INDEX: 29.45 KG/M2 | HEART RATE: 81 BPM | HEIGHT: 60 IN | DIASTOLIC BLOOD PRESSURE: 86 MMHG | SYSTOLIC BLOOD PRESSURE: 127 MMHG | WEIGHT: 150 LBS | TEMPERATURE: 99 F | OXYGEN SATURATION: 100 % | RESPIRATION RATE: 18 BRPM

## 2024-10-09 DIAGNOSIS — D64.9 CHRONIC ANEMIA: ICD-10-CM

## 2024-10-09 DIAGNOSIS — N93.9 ABNORMAL UTERINE BLEEDING (AUB): Primary | ICD-10-CM

## 2024-10-09 LAB
ALBUMIN SERPL-MCNC: 3.9 G/DL (ref 3.5–5)
ALBUMIN/GLOB SERPL: 1.1 RATIO (ref 1.1–2)
ALP SERPL-CCNC: 90 UNIT/L (ref 40–150)
ALT SERPL-CCNC: 26 UNIT/L (ref 0–55)
ANION GAP SERPL CALC-SCNC: 8 MEQ/L
AST SERPL-CCNC: 29 UNIT/L (ref 5–34)
BASOPHILS # BLD AUTO: 0.05 X10(3)/MCL
BASOPHILS NFR BLD AUTO: 0.7 %
BILIRUB SERPL-MCNC: 0.3 MG/DL
BUN SERPL-MCNC: 11.7 MG/DL (ref 9.8–20.1)
CALCIUM SERPL-MCNC: 9.6 MG/DL (ref 8.4–10.2)
CHLORIDE SERPL-SCNC: 108 MMOL/L (ref 98–107)
CO2 SERPL-SCNC: 26 MMOL/L (ref 22–29)
CREAT SERPL-MCNC: 0.88 MG/DL (ref 0.55–1.02)
CREAT/UREA NIT SERPL: 13
EOSINOPHIL # BLD AUTO: 0.14 X10(3)/MCL (ref 0–0.9)
EOSINOPHIL NFR BLD AUTO: 1.9 %
ERYTHROCYTE [DISTWIDTH] IN BLOOD BY AUTOMATED COUNT: 15 % (ref 11.5–17)
GFR SERPLBLD CREATININE-BSD FMLA CKD-EPI: >60 ML/MIN/1.73/M2
GLOBULIN SER-MCNC: 3.5 GM/DL (ref 2.4–3.5)
GLUCOSE SERPL-MCNC: 107 MG/DL (ref 74–100)
HCT VFR BLD AUTO: 30.5 % (ref 37–47)
HGB BLD-MCNC: 9.8 G/DL (ref 12–16)
HOLD SPECIMEN: NORMAL
IMM GRANULOCYTES # BLD AUTO: 0.05 X10(3)/MCL (ref 0–0.04)
IMM GRANULOCYTES NFR BLD AUTO: 0.7 %
LYMPHOCYTES # BLD AUTO: 2.37 X10(3)/MCL (ref 0.6–4.6)
LYMPHOCYTES NFR BLD AUTO: 31.4 %
MCH RBC QN AUTO: 27 PG (ref 27–31)
MCHC RBC AUTO-ENTMCNC: 32.1 G/DL (ref 33–36)
MCV RBC AUTO: 84 FL (ref 80–94)
MONOCYTES # BLD AUTO: 0.72 X10(3)/MCL (ref 0.1–1.3)
MONOCYTES NFR BLD AUTO: 9.5 %
NEUTROPHILS # BLD AUTO: 4.22 X10(3)/MCL (ref 2.1–9.2)
NEUTROPHILS NFR BLD AUTO: 55.8 %
NRBC BLD AUTO-RTO: 0 %
PLATELET # BLD AUTO: 277 X10(3)/MCL (ref 130–400)
PMV BLD AUTO: 12 FL (ref 7.4–10.4)
POTASSIUM SERPL-SCNC: 3.6 MMOL/L (ref 3.5–5.1)
PROT SERPL-MCNC: 7.4 GM/DL (ref 6.4–8.3)
RBC # BLD AUTO: 3.63 X10(6)/MCL (ref 4.2–5.4)
SODIUM SERPL-SCNC: 142 MMOL/L (ref 136–145)
WBC # BLD AUTO: 7.55 X10(3)/MCL (ref 4.5–11.5)

## 2024-10-09 PROCEDURE — 99283 EMERGENCY DEPT VISIT LOW MDM: CPT

## 2024-10-09 PROCEDURE — 80053 COMPREHEN METABOLIC PANEL: CPT | Performed by: PHYSICIAN ASSISTANT

## 2024-10-09 PROCEDURE — 85025 COMPLETE CBC W/AUTO DIFF WBC: CPT | Performed by: PHYSICIAN ASSISTANT

## 2024-10-09 RX ORDER — ASCORBIC ACID 250 MG
250 TABLET ORAL 2 TIMES DAILY
Qty: 60 TABLET | Refills: 0 | Status: SHIPPED | OUTPATIENT
Start: 2024-10-09 | End: 2024-10-09

## 2024-10-09 RX ORDER — FERROUS GLUCONATE 324(38)MG
324 TABLET ORAL
Qty: 30 TABLET | Refills: 0 | Status: SHIPPED | OUTPATIENT
Start: 2024-10-09 | End: 2024-11-08

## 2024-10-09 RX ORDER — ASCORBIC ACID 250 MG
250 TABLET ORAL DAILY
Qty: 30 TABLET | Refills: 0 | Status: SHIPPED | OUTPATIENT
Start: 2024-10-09 | End: 2024-11-08

## 2024-10-10 NOTE — DISCHARGE INSTRUCTIONS
Take iron as prescribed with vitamin-C to help prevent further anemia.  Follow up with your doctor within 2-3 days and return if symptoms worsen.

## 2024-10-10 NOTE — ED PROVIDER NOTES
Encounter Date: 10/9/2024       History     Chief Complaint   Patient presents with    Vaginal Bleeding     Pt. Arrives with c/o heavy menstrual bleeding, pain and bloating. Has needed a transfusion in the past, about a year ago     Sandra Vega is a 54 y.o. female with a history of anemia, anxiety, HTN who presents to the ED for evaluation of heavy vaginal bleeding x 3 days. Reports this has been ongoing intermittently for the last year. Her OBGYN is aware and says it is due to being perimenopausal. Today she has bled through her pad 4 times and had to change her clothes. Reports history of requiring blood transfusion so wanted to have her levels checked. Reports mild abdominal cramping. Denies dizziness, lightheadedness, SOB, syncope.     The history is provided by the patient.     Review of patient's allergies indicates:  No Known Allergies  Past Medical History:   Diagnosis Date    Anemia     Anxiety disorder, unspecified     Primary hypertension 2023     Past Surgical History:   Procedure Laterality Date     SECTION       Family History   Problem Relation Name Age of Onset    Arthritis Mother      Alzheimer's disease Mother       Social History     Tobacco Use    Smoking status: Never    Smokeless tobacco: Never   Substance Use Topics    Alcohol use: Yes    Drug use: Never     Review of Systems   Constitutional:  Negative for fever.   HENT:  Negative for sore throat.    Respiratory:  Negative for shortness of breath.    Cardiovascular:  Negative for chest pain.   Gastrointestinal:  Negative for nausea.   Genitourinary:  Positive for vaginal bleeding. Negative for dysuria.   Musculoskeletal:  Negative for back pain.   Skin:  Negative for rash.   Neurological:  Negative for weakness.   Hematological:  Does not bruise/bleed easily.       Physical Exam     Initial Vitals [10/09/24 2015]   BP Pulse Resp Temp SpO2   138/85 87 17 98.7 °F (37.1 °C) 98 %      MAP       --         Physical Exam    Nursing  note and vitals reviewed.  Constitutional: She appears well-developed and well-nourished. No distress.   HENT:   Head: Normocephalic and atraumatic. Mouth/Throat: No oropharyngeal exudate.   Eyes: EOM are normal. No scleral icterus.   Neck: Neck supple.   Normal range of motion.  Cardiovascular:  Normal rate and regular rhythm.           No murmur heard.  Pulmonary/Chest: Breath sounds normal. No respiratory distress. She has no wheezes.   Abdominal: Abdomen is soft. Bowel sounds are normal. She exhibits no distension. There is no abdominal tenderness.   Musculoskeletal:         General: No tenderness. Normal range of motion.      Cervical back: Normal range of motion and neck supple.     Neurological: She is alert and oriented to person, place, and time. No cranial nerve deficit.   Skin: Skin is warm and dry. Capillary refill takes less than 2 seconds. No erythema.   Psychiatric: She has a normal mood and affect. Her behavior is normal. Judgment and thought content normal.         ED Course   Procedures  Labs Reviewed   COMPREHENSIVE METABOLIC PANEL - Abnormal       Result Value    Sodium 142      Potassium 3.6      Chloride 108 (*)     CO2 26      Glucose 107 (*)     Blood Urea Nitrogen 11.7      Creatinine 0.88      Calcium 9.6      Protein Total 7.4      Albumin 3.9      Globulin 3.5      Albumin/Globulin Ratio 1.1      Bilirubin Total 0.3      ALP 90      ALT 26      AST 29      eGFR >60      Anion Gap 8.0      BUN/Creatinine Ratio 13     CBC WITH DIFFERENTIAL - Abnormal    WBC 7.55      RBC 3.63 (*)     Hgb 9.8 (*)     Hct 30.5 (*)     MCV 84.0      MCH 27.0      MCHC 32.1 (*)     RDW 15.0      Platelet 277      MPV 12.0 (*)     Neut % 55.8      Lymph % 31.4      Mono % 9.5      Eos % 1.9      Basophil % 0.7      Lymph # 2.37      Neut # 4.22      Mono # 0.72      Eos # 0.14      Baso # 0.05      IG# 0.05 (*)     IG% 0.7      NRBC% 0.0     CBC W/ AUTO DIFFERENTIAL    Narrative:     The following orders were  created for panel order CBC auto differential.  Procedure                               Abnormality         Status                     ---------                               -----------         ------                     CBC with Differential[[id number]]       Abnormal            Final result                 Please view results for these tests on the individual orders.   EXTRA TUBES    Narrative:     The following orders were created for panel order EXTRA TUBES.  Procedure                               Abnormality         Status                     ---------                               -----------         ------                     Light Blue Top Hold[[id number]]                             In process                 Gold Top Hold[[id number]]                                   In process                 Pink Top Hold[[id number]]                                   In process                   Please view results for these tests on the individual orders.   LIGHT BLUE TOP HOLD   GOLD TOP HOLD   PINK TOP HOLD          Imaging Results    None          Medications - No data to display  Medical Decision Making  Sandra Vega is a 54 y.o. female with a history of anemia, anxiety, HTN who presents to the ED for evaluation of heavy vaginal bleeding x 3 days. Reports this has been ongoing intermittently for the last year. Her OBGYN is aware and says it is due to being perimenopausal. Today she has bled through her pad 4 times and had to change her clothes. Reports history of requiring blood transfusion so wanted to have her levels checked. Reports mild abdominal cramping. Denies dizziness, lightheadedness, SOB, syncope.     DDx: anemia, dehydration, electrolyte abnormality     Hemoglobin: 9.8, hematocrit: 30.5, chronic.      Amount and/or Complexity of Data Reviewed  Labs: ordered. Decision-making details documented in ED Course.    Risk  OTC drugs.               ED Course as of 10/09/24 2153   Wed Oct 09, 2024   2101 Pt  transitioned to Linsey Kramer PA-C at this time [KD]   2126 Hemoglobin(!): 9.8 [ER]   2126 Hematocrit(!): 30.5 [ER]   2152 The patient is resting comfortably and in no acute distress.   I personally discussed her test results and treatment plan.  Gave strict ED precautions, discussed specific conditions for return to the emergency department and importance of follow up with her primary care provider.  Patient voices understanding and agrees to the plan discussed. All patients' questions have been answered at this time.   She has remained hemodynamically stable throughout entire stay in ED and is stable for discharge home.  [ER]      ED Course User Index  [ER] Linsey Kramer PA  [KD] Guerita Ma PA-C                             Clinical Impression:  Final diagnoses:  [N93.9] Abnormal uterine bleeding (AUB) (Primary)  [D64.9] Chronic anemia          ED Disposition Condition    Discharge Stable          ED Prescriptions       Medication Sig Dispense Start Date End Date Auth. Provider    ferrous gluconate (FERGON) 324 MG tablet Take 1 tablet (324 mg total) by mouth daily with breakfast. 30 tablet 10/9/2024 11/8/2024 Linsey Kramer PA    ascorbic acid, vitamin C, (VITAMIN C) 250 MG tablet  (Status: Discontinued) Take 1 tablet (250 mg total) by mouth 2 (two) times daily. 60 tablet 10/9/2024 10/9/2024 Linsey Kramer PA    ascorbic acid, vitamin C, (VITAMIN C) 250 MG tablet Take 1 tablet (250 mg total) by mouth once daily. 30 tablet 10/9/2024 11/8/2024 Linsey Kramer PA          Follow-up Information       Follow up With Specialties Details Why Contact Info    [facility] - Emergency Dept Emergency Medicine  As needed, If symptoms worsen [address] [facility] [address] [phone]    Tabitha Ward, [facility] Family Medicine Schedule an appointment as soon as possible for a visit in 2 days  [address] [facility] [address]  [phone]               Linsey Kramer PA  10/09/24  4072

## 2024-10-11 ENCOUNTER — HOSPITAL ENCOUNTER (EMERGENCY)
Facility: HOSPITAL | Age: 54
Discharge: HOME | End: 2024-10-11
Attending: EMERGENCY MEDICINE
Payer: COMMERCIAL

## 2024-10-11 VITALS
BODY MASS INDEX: 27.48 KG/M2 | HEIGHT: 60 IN | DIASTOLIC BLOOD PRESSURE: 75 MMHG | OXYGEN SATURATION: 98 % | WEIGHT: 140 LBS | HEART RATE: 82 BPM | RESPIRATION RATE: 16 BRPM | TEMPERATURE: 98 F | SYSTOLIC BLOOD PRESSURE: 127 MMHG

## 2024-10-11 DIAGNOSIS — N39.0 ACUTE UTI: ICD-10-CM

## 2024-10-11 DIAGNOSIS — N93.8 DYSFUNCTIONAL UTERINE BLEEDING: Primary | ICD-10-CM

## 2024-10-11 LAB
ALBUMIN SERPL-MCNC: 3.5 G/DL (ref 3.5–5)
ALBUMIN/GLOB SERPL: 1.1 RATIO (ref 1.1–2)
ALP SERPL-CCNC: 78 UNIT/L (ref 40–150)
ALT SERPL-CCNC: 24 UNIT/L (ref 0–55)
ANION GAP SERPL CALC-SCNC: 6 MEQ/L
AST SERPL-CCNC: 27 UNIT/L (ref 5–34)
B-HCG UR QL: NEGATIVE
BACTERIA #/AREA URNS AUTO: ABNORMAL /HPF
BASOPHILS # BLD AUTO: 0.05 X10(3)/MCL
BASOPHILS NFR BLD AUTO: 0.7 %
BILIRUB SERPL-MCNC: 0.3 MG/DL
BILIRUB UR QL STRIP.AUTO: NEGATIVE
BUN SERPL-MCNC: 11.3 MG/DL (ref 9.8–20.1)
CALCIUM SERPL-MCNC: 8.8 MG/DL (ref 8.4–10.2)
CHLORIDE SERPL-SCNC: 108 MMOL/L (ref 98–107)
CLARITY UR: CLEAR
CO2 SERPL-SCNC: 26 MMOL/L (ref 22–29)
COLOR UR AUTO: ABNORMAL
CREAT SERPL-MCNC: 0.8 MG/DL (ref 0.55–1.02)
CREAT/UREA NIT SERPL: 14
EOSINOPHIL # BLD AUTO: 0.1 X10(3)/MCL (ref 0–0.9)
EOSINOPHIL NFR BLD AUTO: 1.4 %
ERYTHROCYTE [DISTWIDTH] IN BLOOD BY AUTOMATED COUNT: 15.3 % (ref 11.5–17)
GFR SERPLBLD CREATININE-BSD FMLA CKD-EPI: >60 ML/MIN/1.73/M2
GLOBULIN SER-MCNC: 3.3 GM/DL (ref 2.4–3.5)
GLUCOSE SERPL-MCNC: 98 MG/DL (ref 74–100)
GLUCOSE UR QL STRIP: NEGATIVE
HCT VFR BLD AUTO: 29.5 % (ref 37–47)
HGB BLD-MCNC: 9.3 G/DL (ref 12–16)
HGB UR QL STRIP: ABNORMAL
IMM GRANULOCYTES # BLD AUTO: 0.03 X10(3)/MCL (ref 0–0.04)
IMM GRANULOCYTES NFR BLD AUTO: 0.4 %
KETONES UR QL STRIP: NEGATIVE
LEUKOCYTE ESTERASE UR QL STRIP: NEGATIVE
LYMPHOCYTES # BLD AUTO: 2.4 X10(3)/MCL (ref 0.6–4.6)
LYMPHOCYTES NFR BLD AUTO: 33.5 %
MCH RBC QN AUTO: 26.4 PG (ref 27–31)
MCHC RBC AUTO-ENTMCNC: 31.5 G/DL (ref 33–36)
MCV RBC AUTO: 83.8 FL (ref 80–94)
MONOCYTES # BLD AUTO: 0.62 X10(3)/MCL (ref 0.1–1.3)
MONOCYTES NFR BLD AUTO: 8.7 %
MUCOUS THREADS URNS QL MICRO: ABNORMAL /LPF
NEUTROPHILS # BLD AUTO: 3.96 X10(3)/MCL (ref 2.1–9.2)
NEUTROPHILS NFR BLD AUTO: 55.3 %
NITRITE UR QL STRIP: POSITIVE
NRBC BLD AUTO-RTO: 0 %
PH UR STRIP: 6.5 [PH]
PLATELET # BLD AUTO: 276 X10(3)/MCL (ref 130–400)
PMV BLD AUTO: 12 FL (ref 7.4–10.4)
POTASSIUM SERPL-SCNC: 3.7 MMOL/L (ref 3.5–5.1)
PROT SERPL-MCNC: 6.8 GM/DL (ref 6.4–8.3)
PROT UR QL STRIP: >=300
RBC # BLD AUTO: 3.52 X10(6)/MCL (ref 4.2–5.4)
RBC #/AREA URNS AUTO: >100 /HPF
SODIUM SERPL-SCNC: 140 MMOL/L (ref 136–145)
SP GR UR STRIP.AUTO: 1.02 (ref 1–1.03)
SQUAMOUS #/AREA URNS AUTO: ABNORMAL /HPF
UROBILINOGEN UR STRIP-ACNC: 2
WBC # BLD AUTO: 7.16 X10(3)/MCL (ref 4.5–11.5)
WBC #/AREA URNS AUTO: ABNORMAL /HPF

## 2024-10-11 PROCEDURE — 81003 URINALYSIS AUTO W/O SCOPE: CPT | Performed by: EMERGENCY MEDICINE

## 2024-10-11 PROCEDURE — 85025 COMPLETE CBC W/AUTO DIFF WBC: CPT | Performed by: EMERGENCY MEDICINE

## 2024-10-11 PROCEDURE — 80053 COMPREHEN METABOLIC PANEL: CPT | Performed by: EMERGENCY MEDICINE

## 2024-10-11 PROCEDURE — 81025 URINE PREGNANCY TEST: CPT | Performed by: EMERGENCY MEDICINE

## 2024-10-11 PROCEDURE — 99284 EMERGENCY DEPT VISIT MOD MDM: CPT

## 2024-10-11 RX ORDER — TRAMADOL HYDROCHLORIDE 50 MG/1
50 TABLET ORAL EVERY 6 HOURS PRN
Qty: 20 TABLET | Refills: 0 | Status: SHIPPED | OUTPATIENT
Start: 2024-10-11 | End: 2024-10-16

## 2024-10-11 RX ORDER — NITROFURANTOIN 25; 75 MG/1; MG/1
100 CAPSULE ORAL 2 TIMES DAILY
Qty: 10 CAPSULE | Refills: 0 | Status: SHIPPED | OUTPATIENT
Start: 2024-10-11 | End: 2024-10-16

## 2024-10-11 RX ORDER — TRANEXAMIC ACID 650 MG/1
1300 TABLET ORAL 3 TIMES DAILY
Qty: 30 TABLET | Refills: 0 | Status: SHIPPED | OUTPATIENT
Start: 2024-10-11 | End: 2024-10-16

## 2024-10-11 NOTE — Clinical Note
"Sandra Vega (Trisha) was seen and treated in our emergency department on 10/11/2024.  She may return to work on 10/13/2024.       If you have any questions or concerns, please don't hesitate to call.       RN    "

## 2024-10-11 NOTE — ED PROVIDER NOTES
Encounter Date: 10/11/2024       History     Chief Complaint   Patient presents with    Weakness    Vaginal Bleeding     The history is provided by the patient.   Vaginal Bleeding  This is a recurrent problem. The current episode started more than 2 days ago. The problem occurs constantly. The problem has not changed since onset.Pertinent negatives include no chest pain and no shortness of breath. Nothing aggravates the symptoms. Nothing relieves the symptoms.   Reports mild pelvic pain and weakness.  Seen at [facility] for same complaint 2 days ago.    Review of patient's allergies indicates:  No Known Allergies  Past Medical History:   Diagnosis Date    Anemia     Anxiety disorder, unspecified     Primary hypertension 2023     Past Surgical History:   Procedure Laterality Date     SECTION       Family History   Problem Relation Name Age of Onset    Arthritis Mother      Alzheimer's disease Mother       Social History     Tobacco Use    Smoking status: Never    Smokeless tobacco: Never   Substance Use Topics    Alcohol use: Yes    Drug use: Never     Review of Systems   Constitutional:  Negative for fever.   HENT:  Negative for sore throat.    Respiratory:  Negative for shortness of breath.    Cardiovascular:  Negative for chest pain.   Gastrointestinal:  Negative for nausea.   Genitourinary:  Positive for vaginal bleeding. Negative for dysuria.   Musculoskeletal:  Negative for back pain.   Skin:  Negative for rash.   Neurological:  Negative for weakness.   Hematological:  Does not bruise/bleed easily.       Physical Exam     Initial Vitals [10/11/24 1737]   BP Pulse Resp Temp SpO2   127/75 82 16 97.8 °F (36.6 °C) 98 %      MAP       --         Physical Exam    Nursing note and vitals reviewed.  Constitutional: She appears well-developed and well-nourished.   HENT:   Head: Normocephalic and atraumatic.   Right Ear: External ear normal.   Left Ear: External ear normal.   Eyes: Conjunctivae and EOM are  normal. Pupils are equal, round, and reactive to light.   Neck: Neck supple.   Normal range of motion.  Cardiovascular:  Normal rate, regular rhythm, normal heart sounds and intact distal pulses.           Pulmonary/Chest: Breath sounds normal.   Abdominal: Abdomen is soft. Bowel sounds are normal. There is abdominal tenderness in the suprapubic area.     There is no rebound and no guarding.   Musculoskeletal:         General: Normal range of motion.      Cervical back: Normal range of motion and neck supple.     Neurological: She is alert and oriented to person, place, and time. GCS score is 15. GCS eye subscore is 4. GCS verbal subscore is 5. GCS motor subscore is 6.   Skin: Skin is warm and dry. Capillary refill takes less than 2 seconds.   Psychiatric: She has a normal mood and affect. Her behavior is normal. Judgment and thought content normal.         ED Course   Procedures  Labs Reviewed   COMPREHENSIVE METABOLIC PANEL - Abnormal       Result Value    Sodium 140      Potassium 3.7      Chloride 108 (*)     CO2 26      Glucose 98      Blood Urea Nitrogen 11.3      Creatinine 0.80      Calcium 8.8      Protein Total 6.8      Albumin 3.5      Globulin 3.3      Albumin/Globulin Ratio 1.1      Bilirubin Total 0.3      ALP 78      ALT 24      AST 27      eGFR >60      Anion Gap 6.0      BUN/Creatinine Ratio 14     URINALYSIS, REFLEX TO URINE CULTURE - Abnormal    Color, UA Red (*)     Appearance, UA Clear      Specific Gravity, UA 1.025      pH, UA 6.5      Protein, UA >=300 (*)     Glucose, UA Negative      Ketones, UA Negative      Blood, UA Large (*)     Bilirubin, UA Negative      Urobilinogen, UA 2.0 (*)     Nitrites, UA Positive (*)     Leukocyte Esterase, UA Negative     CBC WITH DIFFERENTIAL - Abnormal    WBC 7.16      RBC 3.52 (*)     Hgb 9.3 (*)     Hct 29.5 (*)     MCV 83.8      MCH 26.4 (*)     MCHC 31.5 (*)     RDW 15.3      Platelet 276      MPV 12.0 (*)     Neut % 55.3      Lymph % 33.5      Mono %  8.7      Eos % 1.4      Basophil % 0.7      Lymph # 2.40      Neut # 3.96      Mono # 0.62      Eos # 0.10      Baso # 0.05      IG# 0.03      IG% 0.4      NRBC% 0.0     URINALYSIS, MICROSCOPIC - Abnormal    Bacteria, UA Few (*)     Mucous, UA Small (*)     RBC, UA >100 (*)     WBC, UA 3-5      Squamous Epithelial Cells, UA Few (*)     Narrative:     Urine microscopic results may be inaccurate, <12 cc sample submitted   PREGNANCY TEST, URINE RAPID - Normal    hCG Qualitative, Urine Negative     CBC W/ AUTO DIFFERENTIAL    Narrative:     The following orders were created for panel order CBC auto differential.  Procedure                               Abnormality         Status                     ---------                               -----------         ------                     CBC with Differential[[id number]]       Abnormal            Final result                 Please view results for these tests on the individual orders.          Imaging Results    None          Medications - No data to display  Medical Decision Making  Amount and/or Complexity of Data Reviewed  Labs: ordered. Decision-making details documented in ED Course.    Risk  Prescription drug management.    Differential includes:  perimenopausal, DUB, fibroids, worried well, anemia.  Will obtain CBC, CMP, UA, UPT.  If labs stable, will D/C with short course of TXA.           ED Course as of 10/11/24 1835   Fri Oct 11, 2024   1821 Hgb with minimal drop from 2 days ago.  Stable for D/C.  Will cover with short course of antibiotics for nitrite + urine. [CL]      ED Course User Index  [CL] Vin Venegas MD                           Clinical Impression:  Final diagnoses:  [N93.8] Dysfunctional uterine bleeding (Primary)  [N39.0] Acute UTI          ED Disposition Condition    Discharge Stable          ED Prescriptions       Medication Sig Dispense Start Date End Date Auth. Provider    nitrofurantoin, macrocrystal-monohydrate, (MACROBID) 100 MG  capsule Take 1 capsule (100 mg total) by mouth 2 (two) times daily. for 5 days 10 capsule 10/11/2024 10/16/2024 Vin Venegas MD    tranexamic acid (LYSTEDA) 650 mg tablet Take 2 tablets (1,300 mg total) by mouth 3 (three) times daily. for 5 days 30 tablet 10/11/2024 10/16/2024 Vin Venegas MD    traMADoL (ULTRAM) 50 mg tablet Take 1 tablet (50 mg total) by mouth every 6 (six) hours as needed for Pain. Final diagnoses: [N93.8] Dysfunctional uterine bleeding (Primary) [N39.0] Acute UTI 20 tablet 10/11/2024 10/16/2024 Vin Venegas MD          Follow-up Information    None          Vin Venegas MD  10/11/24 3711

## 2024-12-02 ENCOUNTER — OFFICE VISIT (OUTPATIENT)
Dept: INTERNAL MEDICINE | Facility: CLINIC | Age: 54
End: 2024-12-02
Payer: COMMERCIAL

## 2024-12-02 VITALS
BODY MASS INDEX: 29.64 KG/M2 | HEIGHT: 61 IN | RESPIRATION RATE: 18 BRPM | TEMPERATURE: 98 F | DIASTOLIC BLOOD PRESSURE: 69 MMHG | WEIGHT: 157 LBS | HEART RATE: 86 BPM | SYSTOLIC BLOOD PRESSURE: 116 MMHG

## 2024-12-02 DIAGNOSIS — Z13.6 SCREENING FOR HYPERTENSION: ICD-10-CM

## 2024-12-02 DIAGNOSIS — N93.9 ABNORMAL UTERINE BLEEDING (AUB): Primary | ICD-10-CM

## 2024-12-02 PROCEDURE — 99214 OFFICE O/P EST MOD 30 MIN: CPT | Mod: PBBFAC | Performed by: NURSE PRACTITIONER

## 2024-12-02 PROCEDURE — 1159F MED LIST DOCD IN RCRD: CPT | Mod: CPTII,,, | Performed by: NURSE PRACTITIONER

## 2024-12-02 PROCEDURE — 1160F RVW MEDS BY RX/DR IN RCRD: CPT | Mod: CPTII,,, | Performed by: NURSE PRACTITIONER

## 2024-12-02 PROCEDURE — 3008F BODY MASS INDEX DOCD: CPT | Mod: CPTII,,, | Performed by: NURSE PRACTITIONER

## 2024-12-02 PROCEDURE — 3078F DIAST BP <80 MM HG: CPT | Mod: CPTII,,, | Performed by: NURSE PRACTITIONER

## 2024-12-02 PROCEDURE — 3074F SYST BP LT 130 MM HG: CPT | Mod: CPTII,,, | Performed by: NURSE PRACTITIONER

## 2024-12-02 PROCEDURE — 99213 OFFICE O/P EST LOW 20 MIN: CPT | Mod: S$PBB,,, | Performed by: NURSE PRACTITIONER

## 2024-12-02 NOTE — PROGRESS NOTES
Patient ID: [id number]     Chief Complaint: Dysfunctional Uterine Bleeding        HPI:     HPI      Sandra Vega is a 54 y.o. female here today for ER follow up for AUB. Pt seen in ER 10-9-24 and 10-11-24. Pt has [facility] GYN appt scheduled 25 however they do not accept her insurance for specialty services. Pt states she has been having irregular heavy menses where she has a period every other month for approx 1 year. Informed pt she will need to find a outside GYN to refer to.         Immunizations:   Immunization History   Administered Date(s) Administered    COVID-19, MRNA, LN-S, PF (Pfizer) (Purple Cap) 2021, 2021    Influenza - Quadrivalent 2020, 2020    Influenza - Quadrivalent - MDCK - PF 10/05/2019    Influenza - Quadrivalent - PF (6-35 months) 2019    Influenza - Quadrivalent - PF *Preferred* (6 months and older) 2020    Influenza - Trivalent - Fluarix, Flulaval, Fluzone, Afluria - PF 10/19/2017    PPD Test 2018    Pneumococcal Polysaccharide - 23 Valent 2019    Tdap 2018        -------------------------------------    Anemia    Anxiety disorder, unspecified    Primary hypertension        Past Surgical History:   Procedure Laterality Date     SECTION         Review of patient's allergies indicates:  No Known Allergies    Current Outpatient Medications   Medication Instructions    albuterol (PROVENTIL/VENTOLIN HFA) 90 mcg/actuation inhaler INHALE 2 PUFFS BY MOUTH EVERY 4 HOURS AS NEEDED FOR SHORTNESS OF BREATH OR WHEEZING    aspirin 81 mg, Daily    blood sugar diagnostic (ACCU-CHEK GUIDE TEST STRIPS) Strp CHECK CBG ONCE DAILY AND KEEP LOG.    dicyclomine (BENTYL) 10 mg, Oral, 3 times daily PRN    hydrOXYzine HCL (ATARAX) 25 mg, Oral, 3 times daily PRN    ibuprofen (ADVIL,MOTRIN) 600 mg, Oral, Every 6 hours PRN    lactulose (CHRONULAC) 15 g, Oral, 3 times daily PRN    metFORMIN (GLUCOPHAGE) 500 mg, Oral, With breakfast    pantoprazole  (PROTONIX) 40 mg, Oral, Daily       Social History     Socioeconomic History    Marital status: Unknown   Tobacco Use    Smoking status: Never    Smokeless tobacco: Never   Substance and Sexual Activity    Alcohol use: Yes    Drug use: Never    Sexual activity: Not Currently     Social Drivers of Health     Financial Resource Strain: Low Risk  (12/2/2024)    Overall Financial Resource Strain (CARDIA)     Difficulty of Paying Living Expenses: Not hard at all   Food Insecurity: No Food Insecurity (12/2/2024)    Hunger Vital Sign     Worried About Running Out of Food in the Last Year: Never true     Ran Out of Food in the Last Year: Never true   Transportation Needs: No Transportation Needs (12/2/2024)    TRANSPORTATION NEEDS     Transportation : No   Physical Activity: Inactive (12/2/2024)    Exercise Vital Sign     Days of Exercise per Week: 0 days     Minutes of Exercise per Session: 0 min   Stress: No Stress Concern Present (12/2/2024)    [employer] - Occupational Stress Questionnaire     Feeling of Stress : Not at all   Housing Stability: Low Risk  (12/2/2024)    Housing Stability Vital Sign     Unable to Pay for Housing in the Last Year: No     Homeless in the Last Year: No        Family History   Problem Relation Name Age of Onset    Arthritis Mother      Alzheimer's disease Mother          Patient Care Team:  Tabitha Ward FNP as PCP - General (Family Medicine)     Subjective:     Review of Systems     See HPI for details    Constitutional: Denies Change in appetite. Denies Chills. Denies Fever. Denies Night sweats.  Eye: Denies Blurred vision. Denies Discharge. Denies Eye pain.  ENT: Denies Decreased hearing. Denies Sore throat. Denies Swollen glands.  Respiratory: Denies Cough. Denies Shortness of breath. Denies Shortness of breath with exertion. Denies Wheezing.  Cardiovascular: DeniesChest pain at rest. Denies Chest pain with exertion. Denies Irregular heartbeat. Denies  "Palpitations. Denies Edema.  Gastrointestinal: Denies Abdominal pain. DeniesDiarrhea. Denies Nausea. Denies Vomiting. Denies Hematemesis or Hematochezia.  Genitourinary: Denies Dysuria. Denies Urinary frequency. Denies Urinary urgency. Denies Blood in urine.  Endocrine: Denies Cold intolerance. Denies Excessive thirst. Denies Heat intolerance. Denies Weight loss. Denies Weight gain.  Musculoskeletal: Denies Painful joints. Denies Weakness.  Integumentary: Denies Rash. Denies Itching. Denies Dry skin.  Neurologic: Denies Dizziness. Denies Fainting. Denies Headache.  Psychiatric: Denies Depression. Denies Anxiety. Denies Suicidal/Homicidal ideations.    All Other ROS: Negative except as stated in HPI.       Objective:     Visit Vitals  /69 (BP Location: Right arm, Patient Position: Sitting)   Pulse 86   Temp 98.2 °F (36.8 °C) (Oral)   Resp 18   Ht 5' 1" (1.549 m)   Wt 71.2 kg (157 lb)   BMI 29.66 kg/m²       Physical Exam    General: Alert and oriented, No acute distress.  Head: Normocephalic, Atraumatic.  Eye: Pupils are equal, round and reactive to light, Extraocular movements are intact, Sclera non-icteric.  Ears/Nose/Throat: Normal, Mucosa moist,Clear.  Neck/Thyroid: Supple, Non-tender, No carotid bruit, No lymphadenopathy, No JVD, Full range of motion.  Respiratory: Clear to auscultation bilaterally; No wheezes, rales or rhonchi,Non-labored respirations, Symmetrical chest wall expansion.  Cardiovascular: Regular rate and rhythm, S1/S2 normal, No murmurs, rubs or gallops.  Gastrointestinal: Soft, Non-tender, Non-distended, Normal bowel sounds, No palpable organomegaly.  Musculoskeletal: Normal range of motion.  Integumentary: Warm, Dry, Intact, No suspicious lesions or rashes.  Extremities: No clubbing, cyanosis or edema  Neurologic: No focal deficits, Cranial Nerves II-XII are grossly intact, Motor strength normal upper and lower extremities, Sensory exam intact.  Psychiatric: Normal interaction, Coherent " speech, Euthymic mood, Appropriate affect       Labs Reviewed:     Chemistry:  Lab Results   Component Value Date     10/11/2024    K 3.7 10/11/2024    BUN 11.3 10/11/2024    CREATININE 0.80 10/11/2024    EGFRNORACEVR >60 10/11/2024    GLUCOSE 98 10/11/2024    CALCIUM 8.8 10/11/2024    ALKPHOS 78 10/11/2024    LABPROT 6.8 10/11/2024    ALBUMIN 3.5 10/11/2024    BILIDIR 0.1 01/18/2022    IBILI 0.20 01/18/2022    AST 27 10/11/2024    ALT 24 10/11/2024        Lab Results   Component Value Date    HGBA1C 5.4 07/12/2023        Hematology:  Lab Results   Component Value Date    WBC 7.16 10/11/2024    HGB 9.3 (L) 10/11/2024    HCT 29.5 (L) 10/11/2024     10/11/2024       Lipid Panel:  Lab Results   Component Value Date    CHOL 206 (H) 07/12/2023    HDL 39 07/12/2023    .00 (H) 07/12/2023    TRIG 130 07/12/2023    TOTALCHOLEST 5 07/12/2023        Urine:  Lab Results   Component Value Date    APPEARANCEUA Clear 10/11/2024    SGUA 1.025 10/11/2024    PROTEINUA >=300 (A) 10/11/2024    KETONESUA Negative 10/11/2024    LEUKOCYTESUR Negative 10/11/2024    RBCUA >100 (A) 10/11/2024    WBCUA 3-5 10/11/2024    BACTERIA Few (A) 10/11/2024    SQEPUA Few (A) 07/13/2023    HYALINECASTS None Seen 07/13/2023    CREATRANDUR 270.2 (H) 07/13/2023        Assessment:       ICD-10-CM ICD-9-CM   1. Abnormal uterine bleeding (AUB)  N93.9 626.9   2. Screening for hypertension  Z13.6 V81.1        Plan:     1. Abnormal uterine bleeding (AUB) (Primary)  Pt seen in ER 10-9-24 and 10-11-24 for heavy irregular menstrual bleeding. Pt was previously seen in GYN cl here at [facility] however pt now has [facility] exchange and is not accepted here for specialty care. Will get CBC and iron profile before next appt. ER precautions given. Informed pt to call her insurance and find a GYN I can send a referral to for further eval of AUB. Lab orders given to pt to get done at [facility] due to pt's insurance not accepted here at [facility]. Pt  verbalized understanding. ER precautions given if symptoms worsen.      2. Screening for hypertension  Labs in 3 months.     Follow up in about 3 months (around 3/2/2025) for with labs 1 week prior to appt. . In addition to their scheduled follow up, the patient has also been instructed to follow up on as needed basis.     Future Appointments   Date Time Provider Department Center   6/12/2025  9:30 AM Joann Briones, FAITH [facility]        PHYLLIS Munoz

## 2025-02-11 ENCOUNTER — HOSPITAL ENCOUNTER (EMERGENCY)
Facility: HOSPITAL | Age: 55
Discharge: HOME | End: 2025-02-11
Attending: EMERGENCY MEDICINE
Payer: COMMERCIAL

## 2025-02-11 VITALS
TEMPERATURE: 98 F | RESPIRATION RATE: 20 BRPM | HEIGHT: 60 IN | WEIGHT: 150 LBS | SYSTOLIC BLOOD PRESSURE: 140 MMHG | HEART RATE: 80 BPM | DIASTOLIC BLOOD PRESSURE: 84 MMHG | BODY MASS INDEX: 29.45 KG/M2 | OXYGEN SATURATION: 98 %

## 2025-02-11 DIAGNOSIS — M54.2 NECK PAIN: Primary | ICD-10-CM

## 2025-02-11 PROCEDURE — 25000003 PHARM REV CODE 250

## 2025-02-11 PROCEDURE — 99283 EMERGENCY DEPT VISIT LOW MDM: CPT

## 2025-02-11 RX ORDER — CYCLOBENZAPRINE HCL 10 MG
10 TABLET ORAL
Status: COMPLETED | OUTPATIENT
Start: 2025-02-11 | End: 2025-02-11

## 2025-02-11 RX ORDER — CYCLOBENZAPRINE HCL 10 MG
10 TABLET ORAL 3 TIMES DAILY PRN
Qty: 15 TABLET | Refills: 0 | Status: SHIPPED | OUTPATIENT
Start: 2025-02-11 | End: 2025-02-16

## 2025-02-11 RX ORDER — ACETAMINOPHEN 500 MG
1000 TABLET ORAL
Status: COMPLETED | OUTPATIENT
Start: 2025-02-11 | End: 2025-02-11

## 2025-02-11 RX ADMIN — ACETAMINOPHEN 1000 MG: 500 TABLET ORAL at 02:02

## 2025-02-11 RX ADMIN — CYCLOBENZAPRINE 10 MG: 10 TABLET, FILM COATED ORAL at 02:02

## 2025-02-11 NOTE — DISCHARGE INSTRUCTIONS
Take Flexeril as needed for muscle spasms.  Take Tylenol as needed for pain and inflammation.  Use warm compresses and or warm Epson salt baths to help with the pain.  Follow up with your primary care doctor as needed.  If you experience any new or worsening symptoms return to the emergency department.

## 2025-02-11 NOTE — Clinical Note
"Sandra Vega (Trisha) was seen and treated in our emergency department on 2/11/2025.  She may return to work on 02/13/2025.       If you have any questions or concerns, please don't hesitate to call.       RN    "

## 2025-02-11 NOTE — ED PROVIDER NOTES
Encounter Date: 2025       History     Chief Complaint   Patient presents with    Neck Pain     Pt c/o neck pain onset this morning, denies injury.     See MDM    The history is provided by the patient. No  was used.     Review of patient's allergies indicates:  No Known Allergies  Past Medical History:   Diagnosis Date    Anemia     Anxiety disorder, unspecified     Primary hypertension 2023     Past Surgical History:   Procedure Laterality Date     SECTION       Family History   Problem Relation Name Age of Onset    Arthritis Mother      Alzheimer's disease Mother       Social History     Tobacco Use    Smoking status: Never    Smokeless tobacco: Never   Substance Use Topics    Alcohol use: Yes    Drug use: Never     Review of Systems   Constitutional:         Neck pain   All other systems reviewed and are negative.      Physical Exam     Initial Vitals [25 1334]   BP Pulse Resp Temp SpO2   138/85 81 20 98.2 °F (36.8 °C) 98 %      MAP       --         Physical Exam    Nursing note and vitals reviewed.  Constitutional: Vital signs are normal. She appears well-developed and well-nourished. She is not diaphoretic. She is cooperative.  Non-toxic appearance. She does not have a sickly appearance. She does not appear ill. No distress.   HENT:   Head: Normocephalic and atraumatic.   Right Ear: External ear normal.   Left Ear: External ear normal.   Nose: Nose normal. Mouth/Throat: Oropharynx is clear and moist.   Eyes: EOM are normal. Pupils are equal, round, and reactive to light.   Neck: Trachea normal and phonation normal. Neck supple. No Brudzinski's sign and no Kernig's sign noted. Carotid bruit is not present.       Patient endorsing muscular TTP to the right lateral neck along the SCM.  She endorses pain with right cervical rotation with and without resistance.  No overlying skin changes or edema noted.  All other adjacent joints otherwise normal     Cardiovascular:   Normal rate, regular rhythm and intact distal pulses.           Pulmonary/Chest: Breath sounds normal.   Musculoskeletal:      Cervical back: Neck supple. Tenderness present. No edema, erythema or rigidity. Muscular tenderness present. No spinous process tenderness. Decreased range of motion.      Comments: Patient is ambulatory     Neurological: She is alert and oriented to person, place, and time. GCS score is 15. GCS eye subscore is 4. GCS verbal subscore is 5. GCS motor subscore is 6.   Skin: Skin is warm and dry.   Psychiatric: She has a normal mood and affect.         ED Course   Procedures  Labs Reviewed - No data to display       Imaging Results    None          Medications   cyclobenzaprine tablet 10 mg (10 mg Oral Given 2/11/25 1443)   acetaminophen tablet 1,000 mg (1,000 mg Oral Given 2/11/25 1443)     Medical Decision Making  The patient is a 55 y.o. female with a pertinent PMHX of anemia, HTN, anxiety who presents to the Emergency Department with a chief complaint of neck pain. Symptoms began this morning upon awakening and have been constant since onset. Associated symptoms include nothing. The neck pain is currently rated as a 10/10 in severity and described as painful with no radiation.  Symptoms are aggravated with neck movement and alleviated with nothing. The patient denies fever, nausea, vomiting, recent trauma, changes in vision, dysphagia, sore throat, cough, congestion, rash, sick contacts, chest pain, shortness of breath, headache, dizziness, weakness. They report taking Vicks rub, Aleve, Tylenol prior to arrival with not much relief of symptoms. No other reported symptoms at this time.  Patient also states that 2 days ago whenever she woke up in the morning she had similar pain and that it improved with OTC medication.  She feels like the pain is due to a muscle spasm.    Pertinent physical exam findings include Patient endorsing muscular TTP to the right lateral neck along the SCM.  She  "endorses pain with right cervical rotation with and without resistance.  No overlying skin changes or edema noted.  Vital signs stable.  Medication given for pain while in the ER.    Discussed with patient deferring imaging at this time as she has no bony TTP, neurologic deficits, or recent trauma.  Patient agreed.  Medication for pain sent to pharmacy.  Discharge instructions and return precautions provided. Patient verbalized understanding and agreement of plan. All questions answered.    Portions of this note have been created with voice recognition software. Occasional "wrong-words" or "sound alike" substitutions may have occurred due to inherent limitations of voice software. Please read the note carefully and recognize, using context, word substitutions may have occurred.       Risk  OTC drugs.  Prescription drug management.      Additional MDM:   Differential Diagnosis:   Other: The following diagnoses were also considered and will be evaluated: Muscle strain, Meningitis and Radiculopathy.                                   Clinical Impression:  Final diagnoses:  [M54.2] Neck pain (Primary)          ED Disposition Condition    Discharge Stable          ED Prescriptions       Medication Sig Dispense Start Date End Date Auth. Provider    cyclobenzaprine (FLEXERIL) 10 MG tablet Take 1 tablet (10 mg total) by mouth 3 (three) times daily as needed for Muscle spasms. 15 tablet 2/11/2025 2/16/2025 Jennifer Berman PA-C          Follow-up Information       Follow up With Specialties Details Why Contact Info    Tabitha Ward FNP Family Medicine Call in 1 week As needed [id number] [facility] [id number] [phone]               Jennifer Berman PA-C  02/11/25 1514    "

## 2025-04-30 DIAGNOSIS — E11.9 CONTROLLED TYPE 2 DIABETES MELLITUS WITHOUT COMPLICATION, WITHOUT LONG-TERM CURRENT USE OF INSULIN: ICD-10-CM

## 2025-04-30 DIAGNOSIS — D64.9 ANEMIA, UNSPECIFIED TYPE: Primary | ICD-10-CM

## 2025-04-30 DIAGNOSIS — E78.1 HYPERTRIGLYCERIDEMIA: ICD-10-CM

## 2025-04-30 DIAGNOSIS — R79.89 ABNORMAL TSH: ICD-10-CM

## 2025-04-30 DIAGNOSIS — I10 PRIMARY HYPERTENSION: ICD-10-CM

## 2025-05-14 ENCOUNTER — LAB VISIT (OUTPATIENT)
Dept: LAB | Facility: HOSPITAL | Age: 55
End: 2025-05-14
Attending: NURSE PRACTITIONER
Payer: COMMERCIAL

## 2025-05-14 ENCOUNTER — OFFICE VISIT (OUTPATIENT)
Dept: INTERNAL MEDICINE | Facility: CLINIC | Age: 55
End: 2025-05-14
Payer: COMMERCIAL

## 2025-05-14 VITALS
DIASTOLIC BLOOD PRESSURE: 76 MMHG | RESPIRATION RATE: 18 BRPM | BODY MASS INDEX: 29.84 KG/M2 | HEIGHT: 60 IN | TEMPERATURE: 98 F | SYSTOLIC BLOOD PRESSURE: 111 MMHG | HEART RATE: 86 BPM | WEIGHT: 152 LBS

## 2025-05-14 DIAGNOSIS — E78.1 HYPERTRIGLYCERIDEMIA: ICD-10-CM

## 2025-05-14 DIAGNOSIS — Z00.00 WELL ADULT EXAM: ICD-10-CM

## 2025-05-14 DIAGNOSIS — F41.9 ANXIETY: ICD-10-CM

## 2025-05-14 DIAGNOSIS — D64.9 ANEMIA, UNSPECIFIED TYPE: ICD-10-CM

## 2025-05-14 DIAGNOSIS — I10 PRIMARY HYPERTENSION: ICD-10-CM

## 2025-05-14 DIAGNOSIS — R06.02 SOB (SHORTNESS OF BREATH): ICD-10-CM

## 2025-05-14 DIAGNOSIS — E11.9 CONTROLLED TYPE 2 DIABETES MELLITUS WITHOUT COMPLICATION, WITHOUT LONG-TERM CURRENT USE OF INSULIN: ICD-10-CM

## 2025-05-14 DIAGNOSIS — R79.89 ABNORMAL TSH: ICD-10-CM

## 2025-05-14 DIAGNOSIS — K21.9 GASTROESOPHAGEAL REFLUX DISEASE, UNSPECIFIED WHETHER ESOPHAGITIS PRESENT: ICD-10-CM

## 2025-05-14 DIAGNOSIS — E11.9 CONTROLLED TYPE 2 DIABETES MELLITUS WITHOUT COMPLICATION, WITHOUT LONG-TERM CURRENT USE OF INSULIN: Primary | ICD-10-CM

## 2025-05-14 LAB
ANION GAP SERPL CALC-SCNC: 13 MEQ/L
BACTERIA #/AREA URNS AUTO: ABNORMAL /HPF
BASOPHILS # BLD AUTO: 0.05 X10(3)/MCL
BASOPHILS NFR BLD AUTO: 0.7 %
BILIRUB UR QL STRIP.AUTO: NEGATIVE
BUN SERPL-MCNC: 13.2 MG/DL (ref 9.8–20.1)
CALCIUM SERPL-MCNC: 9.4 MG/DL (ref 8.4–10.2)
CHLORIDE SERPL-SCNC: 104 MMOL/L (ref 98–107)
CHOLEST SERPL-MCNC: 220 MG/DL
CHOLEST/HDLC SERPL: 6 {RATIO} (ref 0–5)
CLARITY UR: CLEAR
CO2 SERPL-SCNC: 25 MMOL/L (ref 22–29)
COLOR UR AUTO: ABNORMAL
CREAT SERPL-MCNC: 0.81 MG/DL (ref 0.55–1.02)
CREAT UR-MCNC: 223.7 MG/DL (ref 45–106)
CREAT/UREA NIT SERPL: 16
EOSINOPHIL # BLD AUTO: 0.13 X10(3)/MCL (ref 0–0.9)
EOSINOPHIL NFR BLD AUTO: 1.7 %
ERYTHROCYTE [DISTWIDTH] IN BLOOD BY AUTOMATED COUNT: 15.4 % (ref 11.5–17)
EST. AVERAGE GLUCOSE BLD GHB EST-MCNC: 111.2 MG/DL
GFR SERPLBLD CREATININE-BSD FMLA CKD-EPI: >60 ML/MIN/1.73/M2
GIANT PLATELETS: ABNORMAL
GLUCOSE SERPL-MCNC: 85 MG/DL (ref 74–100)
GLUCOSE UR QL STRIP: NORMAL
HBA1C MFR BLD: 5.5 %
HCT VFR BLD AUTO: 35.7 % (ref 37–47)
HDLC SERPL-MCNC: 40 MG/DL (ref 35–60)
HGB BLD-MCNC: 11.1 G/DL (ref 12–16)
HGB UR QL STRIP: NEGATIVE
HYALINE CASTS #/AREA URNS LPF: ABNORMAL /LPF
IMM GRANULOCYTES # BLD AUTO: 0.05 X10(3)/MCL (ref 0–0.04)
IMM GRANULOCYTES NFR BLD AUTO: 0.7 %
KETONES UR QL STRIP: NEGATIVE
LDLC SERPL CALC-MCNC: 142 MG/DL (ref 50–140)
LEUKOCYTE ESTERASE UR QL STRIP: NEGATIVE
LYMPHOCYTES # BLD AUTO: 2.26 X10(3)/MCL (ref 0.6–4.6)
LYMPHOCYTES NFR BLD AUTO: 29.7 %
MCH RBC QN AUTO: 26.6 PG (ref 27–31)
MCHC RBC AUTO-ENTMCNC: 31.1 G/DL (ref 33–36)
MCV RBC AUTO: 85.6 FL (ref 80–94)
MICROALBUMIN UR-MCNC: 7.1 UG/ML
MICROALBUMIN/CREAT RATIO PNL UR: 3.2 MG/GM CR (ref 0–30)
MONOCYTES # BLD AUTO: 0.8 X10(3)/MCL (ref 0.1–1.3)
MONOCYTES NFR BLD AUTO: 10.5 %
MUCOUS THREADS URNS QL MICRO: ABNORMAL /LPF
NEUTROPHILS # BLD AUTO: 4.31 X10(3)/MCL (ref 2.1–9.2)
NEUTROPHILS NFR BLD AUTO: 56.7 %
NITRITE UR QL STRIP: NEGATIVE
NRBC BLD AUTO-RTO: 0 %
PH UR STRIP: 5.5 [PH]
PLATELET # BLD AUTO: 186 X10(3)/MCL (ref 130–400)
PLATELET # BLD EST: ADEQUATE 10*3/UL
PLATELETS.RETICULATED NFR BLD AUTO: 18 % (ref 0.9–11.2)
PMV BLD AUTO: ABNORMAL FL
POLYCHROMASIA BLD QL SMEAR: SLIGHT
POTASSIUM SERPL-SCNC: 3.8 MMOL/L (ref 3.5–5.1)
PROT UR QL STRIP: NEGATIVE
RBC # BLD AUTO: 4.17 X10(6)/MCL (ref 4.2–5.4)
RBC #/AREA URNS AUTO: ABNORMAL /HPF
SODIUM SERPL-SCNC: 142 MMOL/L (ref 136–145)
SP GR UR STRIP.AUTO: 1.03 (ref 1–1.03)
SQUAMOUS #/AREA URNS LPF: ABNORMAL /HPF
T4 FREE SERPL-MCNC: 0.77 NG/DL (ref 0.7–1.48)
TRIGL SERPL-MCNC: 191 MG/DL (ref 37–140)
TSH SERPL-ACNC: 1.63 UIU/ML (ref 0.35–4.94)
UROBILINOGEN UR STRIP-ACNC: NORMAL
VLDLC SERPL CALC-MCNC: 38 MG/DL
WBC # BLD AUTO: 7.6 X10(3)/MCL (ref 4.5–11.5)
WBC #/AREA URNS AUTO: ABNORMAL /HPF

## 2025-05-14 PROCEDURE — 80048 BASIC METABOLIC PNL TOTAL CA: CPT

## 2025-05-14 PROCEDURE — 84443 ASSAY THYROID STIM HORMONE: CPT

## 2025-05-14 PROCEDURE — 82043 UR ALBUMIN QUANTITATIVE: CPT

## 2025-05-14 PROCEDURE — 85025 COMPLETE CBC W/AUTO DIFF WBC: CPT

## 2025-05-14 PROCEDURE — 83036 HEMOGLOBIN GLYCOSYLATED A1C: CPT

## 2025-05-14 PROCEDURE — 80061 LIPID PANEL: CPT

## 2025-05-14 PROCEDURE — 81001 URINALYSIS AUTO W/SCOPE: CPT

## 2025-05-14 PROCEDURE — 84439 ASSAY OF FREE THYROXINE: CPT

## 2025-05-14 PROCEDURE — 36415 COLL VENOUS BLD VENIPUNCTURE: CPT

## 2025-05-14 PROCEDURE — 99213 OFFICE O/P EST LOW 20 MIN: CPT | Mod: PBBFAC | Performed by: NURSE PRACTITIONER

## 2025-05-14 RX ORDER — HYDROXYZINE HYDROCHLORIDE 25 MG/1
25 TABLET, FILM COATED ORAL 3 TIMES DAILY PRN
Qty: 90 TABLET | Refills: 1 | Status: SHIPPED | OUTPATIENT
Start: 2025-05-14

## 2025-05-14 RX ORDER — PANTOPRAZOLE SODIUM 40 MG/1
40 TABLET, DELAYED RELEASE ORAL DAILY
Qty: 30 TABLET | Refills: 6 | Status: SHIPPED | OUTPATIENT
Start: 2025-05-14

## 2025-05-14 RX ORDER — ALBUTEROL SULFATE 90 UG/1
INHALANT RESPIRATORY (INHALATION)
Qty: 18 G | Refills: 3 | Status: SHIPPED | OUTPATIENT
Start: 2025-05-14

## 2025-05-14 RX ORDER — METFORMIN HYDROCHLORIDE 500 MG/1
500 TABLET ORAL
Qty: 30 TABLET | Refills: 6 | Status: SHIPPED | OUTPATIENT
Start: 2025-05-14

## 2025-05-14 NOTE — PROGRESS NOTES
Patient ID: [id number]     Chief Complaint: Results        HPI:     HPI      Sandra Vega is a 55 y.o. female here today for a follow up.         Immunizations:   Immunization History   Administered Date(s) Administered    COVID-19, MRNA, LN-S, PF (Pfizer) (Purple Cap) 2021, 2021    Influenza - Quadrivalent 2020, 2020    Influenza - Quadrivalent - MDCK - PF 10/05/2019    Influenza - Quadrivalent - PF (6-35 months) 2019    Influenza - Quadrivalent - PF *Preferred* (6 months and older) 2020    Influenza - Trivalent - Fluarix, Flulaval, Fluzone, Afluria - PF 10/19/2017    PPD Test 2018    Pneumococcal Polysaccharide - 23 Valent 2019    Tdap 2018        -------------------------------------    Anemia    Anxiety disorder, unspecified    Primary hypertension        Past Surgical History:   Procedure Laterality Date     SECTION         Review of patient's allergies indicates:  No Known Allergies    Current Outpatient Medications   Medication Instructions    albuterol (PROVENTIL/VENTOLIN HFA) 90 mcg/actuation inhaler INHALE 2 PUFFS BY MOUTH EVERY 4 HOURS AS NEEDED FOR SHORTNESS OF BREATH OR WHEEZING    aspirin 81 mg, Daily    blood sugar diagnostic (ACCU-CHEK GUIDE TEST STRIPS) Strp CHECK CBG ONCE DAILY AND KEEP LOG.    dicyclomine (BENTYL) 10 mg, Oral, 3 times daily PRN    hydrOXYzine HCL (ATARAX) 25 mg, Oral, 3 times daily PRN    ibuprofen (ADVIL,MOTRIN) 600 mg, Oral, Every 6 hours PRN    lactulose (CHRONULAC) 15 g, Oral, 3 times daily PRN    metFORMIN (GLUCOPHAGE) 500 mg, Oral, With breakfast    pantoprazole (PROTONIX) 40 mg, Oral, Daily       Social History[1]     Family History   Problem Relation Name Age of Onset    Arthritis Mother      Alzheimer's disease Mother          Patient Care Team:  Tabitha Ward FNP as PCP - General (Family Medicine)     Subjective:     Review of Systems     See HPI for details    Constitutional: Denies Change in  appetite. Denies Chills. Denies Fever. Denies Night sweats.  Eye: Denies Blurred vision. Denies Discharge. Denies Eye pain.  ENT: Denies Decreased hearing. Denies Sore throat. Denies Swollen glands.  Respiratory: Denies Cough. Denies Shortness of breath. Denies Shortness of breath with exertion. Denies Wheezing.  Cardiovascular: DeniesChest pain at rest. Denies Chest pain with exertion. Denies Irregular heartbeat. Denies Palpitations. Denies Edema.  Gastrointestinal: Denies Abdominal pain. DeniesDiarrhea. Denies Nausea. Denies Vomiting. Denies Hematemesis or Hematochezia.  Genitourinary: Denies Dysuria. Denies Urinary frequency. Denies Urinary urgency. Denies Blood in urine.  Endocrine: Denies Cold intolerance. Denies Excessive thirst. Denies Heat intolerance. Denies Weight loss. Denies Weight gain.  Musculoskeletal: Denies Painful joints. Denies Weakness.  Integumentary: Denies Rash. Denies Itching. Denies Dry skin.  Neurologic: Denies Dizziness. Denies Fainting. Denies Headache.  Psychiatric: Denies Depression. Denies Anxiety. Denies Suicidal/Homicidal ideations.    All Other ROS: Negative except as stated in HPI.       Objective:     Visit Vitals  /76   Pulse 86   Temp 98 °F (36.7 °C) (Oral)   Resp 18   Ht 5' (1.524 m)   Wt 68.9 kg (152 lb)   BMI 29.69 kg/m²       Physical Exam    General: Alert and oriented, No acute distress.  Head: Normocephalic, Atraumatic.  Eye: Pupils are equal, round and reactive to light, Extraocular movements are intact, Sclera non-icteric.  Ears/Nose/Throat: Normal, Mucosa moist,Clear.  Neck/Thyroid: Supple, Non-tender, No carotid bruit, No lymphadenopathy, No JVD, Full range of motion.  Respiratory: Clear to auscultation bilaterally; No wheezes, rales or rhonchi,Non-labored respirations, Symmetrical chest wall expansion.  Cardiovascular: Regular rate and rhythm, S1/S2 normal, No murmurs, rubs or gallops.  Gastrointestinal: Soft, Non-tender, Non-distended, Normal bowel sounds, No  palpable organomegaly.  Musculoskeletal: Normal range of motion.  Integumentary: Warm, Dry, Intact, No suspicious lesions or rashes.  Extremities: No clubbing, cyanosis or edema  Neurologic: No focal deficits, Cranial Nerves II-XII are grossly intact, Motor strength normal upper and lower extremities, Sensory exam intact.  Psychiatric: Normal interaction, Coherent speech, Euthymic mood, Appropriate affect       Labs Reviewed:     Chemistry:  Lab Results   Component Value Date     05/14/2025    K 3.8 05/14/2025    BUN 13.2 05/14/2025    CREATININE 0.81 05/14/2025    EGFRNORACEVR >60 05/14/2025    CALCIUM 9.4 05/14/2025    ALKPHOS 78 10/11/2024    ALBUMIN 3.5 10/11/2024    BILIDIR 0.1 01/18/2022    IBILI 0.20 01/18/2022    AST 27 10/11/2024    ALT 24 10/11/2024        Lab Results   Component Value Date    HGBA1C 5.5 05/14/2025        Hematology:  Lab Results   Component Value Date    WBC 7.60 05/14/2025    HGB 11.1 (L) 05/14/2025    HCT 35.7 (L) 05/14/2025     05/14/2025       Lipid Panel:  Lab Results   Component Value Date    CHOL 220 (H) 05/14/2025    HDL 40 05/14/2025    .00 (H) 05/14/2025    TRIG 191 (H) 05/14/2025    TOTALCHOLEST 6 (H) 05/14/2025        Urine:  Lab Results   Component Value Date    APPEARANCEUA Clear 05/14/2025    SGUA 1.028 05/14/2025    PROTEINUA Negative 05/14/2025    KETONESUA Negative 05/14/2025    LEUKOCYTESUR Negative 05/14/2025    RBCUA 0-5 05/14/2025    WBCUA 0-5 05/14/2025    BACTERIA None Seen 05/14/2025    SQEPUA Few (A) 05/14/2025    HYALINECASTS None Seen 05/14/2025    CREATRANDUR 223.7 (H) 05/14/2025        Assessment:       ICD-10-CM ICD-9-CM   1. Controlled type 2 diabetes mellitus without complication, without long-term current use of insulin  E11.9 250.00   2. Well adult exam  Z00.00 V70.0   3. Primary hypertension  I10 401.9   4. Hypertriglyceridemia  E78.1 272.1        Plan:     1. Controlled type 2 diabetes mellitus without complication, without  long-term current use of insulin (Primary)  A1c 5.5. ADA diet and exercise. Cont Metformin as prescribed. Urine microalbumin 5-14-25.     2. Well adult exam  Labs in  4 months.     3. Primary hypertension  BP controlled. Low fat diet and exercise.     4. Hypertriglyceridemia  Low fat diet and exercise.          Follow up in about 4 months (around 9/14/2025) for with labs 1 week prior to appt. . In addition to their scheduled follow up, the patient has also been instructed to follow up on as needed basis.     Future Appointments   Date Time Provider Department Center   6/12/2025  9:30 AM Joann Briones, ANP [facility]        PHYLLIS Munoz             [1]   Social History  Socioeconomic History    Marital status: Unknown   Tobacco Use    Smoking status: Never    Smokeless tobacco: Never   Substance and Sexual Activity    Alcohol use: Yes    Drug use: Never    Sexual activity: Not Currently     Social Drivers of Health     Financial Resource Strain: Low Risk  (12/2/2024)    Overall Financial Resource Strain (CARDIA)     Difficulty of Paying Living Expenses: Not hard at all   Food Insecurity: No Food Insecurity (12/2/2024)    Hunger Vital Sign     Worried About Running Out of Food in the Last Year: Never true     Ran Out of Food in the Last Year: Never true   Transportation Needs: No Transportation Needs (12/2/2024)    TRANSPORTATION NEEDS     Transportation : No   Physical Activity: Inactive (12/2/2024)    Exercise Vital Sign     Days of Exercise per Week: 0 days     Minutes of Exercise per Session: 0 min   Stress: No Stress Concern Present (12/2/2024)    [place] [employer] - Occupational Stress Questionnaire     Feeling of Stress : Not at all   Housing Stability: Unknown (12/2/2024)    Housing Stability Vital Sign     Unable to Pay for Housing in the Last Year: No     Homeless in the Last Year: No

## 2025-06-09 ENCOUNTER — HOSPITAL ENCOUNTER (EMERGENCY)
Facility: HOSPITAL | Age: 55
Discharge: HOME | End: 2025-06-09
Attending: EMERGENCY MEDICINE
Payer: COMMERCIAL

## 2025-06-09 VITALS
DIASTOLIC BLOOD PRESSURE: 76 MMHG | HEART RATE: 76 BPM | BODY MASS INDEX: 29.45 KG/M2 | WEIGHT: 150 LBS | TEMPERATURE: 99 F | SYSTOLIC BLOOD PRESSURE: 123 MMHG | RESPIRATION RATE: 18 BRPM | HEIGHT: 60 IN | OXYGEN SATURATION: 100 %

## 2025-06-09 DIAGNOSIS — F41.9 ANXIETY: Primary | ICD-10-CM

## 2025-06-09 PROCEDURE — 99283 EMERGENCY DEPT VISIT LOW MDM: CPT

## 2025-06-09 RX ORDER — ESCITALOPRAM OXALATE 10 MG/1
10 TABLET ORAL DAILY
Qty: 90 TABLET | Refills: 3 | Status: SHIPPED | OUTPATIENT
Start: 2025-06-09 | End: 2026-06-09

## 2025-06-09 NOTE — ED PROVIDER NOTES
Encounter Date: 2025       History     Chief Complaint   Patient presents with    Anxiety     Pt concerned at elevated bp at home with feeling tired     [age] year old female history of anxiety, reflux, diabetes says that she checks her blood pressure periodically and noticed that it has been elevated the last couple of days.  Patient got concerned when she kept check it in the kept going higher and higher.  Patient says her pressure is usually around 120/80.  No chest pain, shortness of breath or headache.  Patient says she does not take anything for her blood pressure.  She does suffer from anxiety and did start hydroxyzine recently which he thinks may be elevating her blood pressure.  Patient says she worries throughout most of the day and it keeps her up at night.    The history is provided by the patient.     Review of patient's allergies indicates:  No Known Allergies  Past Medical History:   Diagnosis Date    Anemia     Anxiety disorder, unspecified     Diabetes mellitus     GERD (gastroesophageal reflux disease)     Primary hypertension 2023     Past Surgical History:   Procedure Laterality Date     SECTION       Family History   Problem Relation Name Age of Onset    Arthritis Mother      Alzheimer's disease Mother       Social History[1]  Review of Systems   Constitutional:  Negative for chills, diaphoresis, fatigue and fever.   HENT:  Negative for congestion, drooling, ear discharge, ear pain, postnasal drip, rhinorrhea, sinus pressure, sinus pain, sore throat and tinnitus.    Eyes:  Negative for discharge.   Respiratory:  Negative for cough, chest tightness, shortness of breath and wheezing.    Cardiovascular:  Negative for chest pain and palpitations.   Gastrointestinal:  Negative for abdominal pain, diarrhea, nausea and vomiting.   Genitourinary:  Negative for frequency, hematuria and urgency.   Musculoskeletal:  Negative for back pain, neck pain and neck stiffness.   Skin:   Negative for rash.   Neurological:  Negative for syncope, weakness, light-headedness, numbness and headaches.   Psychiatric/Behavioral:  Negative for sleep disturbance and suicidal ideas. The patient is nervous/anxious. The patient is not hyperactive.        Physical Exam     Initial Vitals [06/09/25 0925]   BP Pulse Resp Temp SpO2   123/76 76 18 98.6 °F (37 °C) 100 %      MAP       --         Physical Exam    Nursing note and vitals reviewed.  Constitutional: No distress.   HENT: Mouth/Throat: Oropharynx is clear and moist.   Eyes: Conjunctivae are normal.   Neck:   Normal range of motion.  Cardiovascular:  Normal rate.           Pulmonary/Chest: Breath sounds normal. No respiratory distress.   Abdominal: There is no abdominal tenderness.   Musculoskeletal:         General: Normal range of motion.      Cervical back: Normal range of motion.     Neurological: She is alert and oriented to person, place, and time. She has normal strength. No cranial nerve deficit. GCS score is 15. GCS eye subscore is 4. GCS verbal subscore is 5. GCS motor subscore is 6.   Skin: Skin is warm. No rash noted. No pallor.         ED Course   Procedures  Labs Reviewed - No data to display       Imaging Results    None          Medications - No data to display  Medical Decision Making  Medical Decision Making  Problem list/ differential diagnosis including but not limited to:  Anxiety, hypertension, med side effect    Patient's chronic illnesses impacting care:  Diabetes    Diagnostic test considered but not ordered:    My interpretations:      Radiology reports      Discussion of case with external qualified healthcare professionals:  Not applicable    Review of external notes( inpt, ems, NH, clinic):      Decision rules/scores:    Medications reviewed:  Protonix, metformin, hydroxyzine  Medications ordered in the ER:  Discharge prescriptions:  Lexapro    Social variables possible impacting patient's healthcare:    Code  status/discussion    Shared decision making:  Patient plans to stop hydroxyzine    Consideration for admission versus discharge: stable for discharge     Risk  Prescription drug management.                                      Clinical Impression:  Final diagnoses:  [F41.9] Anxiety (Primary)          ED Disposition Condition    Discharge Stable          ED Prescriptions       Medication Sig Dispense Start Date End Date Auth. Provider    EScitalopram oxalate (LEXAPRO) 10 MG tablet Take 1 tablet (10 mg total) by mouth once daily. 90 tablet 6/9/2025 6/9/2026 Lucian Miranda MD          Follow-up Information       Follow up With Specialties Details Why Contact Info    Tabitha Ward, P Family Medicine In 1 week  [address] [facility] [id number] [phone]                     [1]   Social History  Tobacco Use    Smoking status: Never    Smokeless tobacco: Never   Substance Use Topics    Alcohol use: Yes    Drug use: Never        Lucian Miranda MD  06/09/25 0953

## 2025-06-09 NOTE — Clinical Note
"Sandra"Oral Vega was seen and treated in our emergency department on 6/9/2025.  She may return to work on 06/10/2025.       If you have any questions or concerns, please don't hesitate to call.      Lucian Miranda MD"

## 2025-06-12 ENCOUNTER — OFFICE VISIT (OUTPATIENT)
Dept: GYNECOLOGY | Facility: CLINIC | Age: 55
End: 2025-06-12
Payer: COMMERCIAL

## 2025-06-12 VITALS
HEIGHT: 60 IN | OXYGEN SATURATION: 100 % | SYSTOLIC BLOOD PRESSURE: 116 MMHG | BODY MASS INDEX: 30.31 KG/M2 | RESPIRATION RATE: 20 BRPM | WEIGHT: 154.38 LBS | HEART RATE: 72 BPM | DIASTOLIC BLOOD PRESSURE: 73 MMHG | TEMPERATURE: 98 F

## 2025-06-12 DIAGNOSIS — Z01.419 ENCOUNTER FOR ANNUAL ROUTINE GYNECOLOGICAL EXAMINATION: Primary | ICD-10-CM

## 2025-06-12 DIAGNOSIS — Z12.31 VISIT FOR SCREENING MAMMOGRAM: ICD-10-CM

## 2025-06-12 DIAGNOSIS — Z12.11 COLON CANCER SCREENING: ICD-10-CM

## 2025-06-12 PROCEDURE — 3044F HG A1C LEVEL LT 7.0%: CPT | Mod: CPTII,,, | Performed by: NURSE PRACTITIONER

## 2025-06-12 PROCEDURE — 3061F NEG MICROALBUMINURIA REV: CPT | Mod: CPTII,,, | Performed by: NURSE PRACTITIONER

## 2025-06-12 PROCEDURE — 1159F MED LIST DOCD IN RCRD: CPT | Mod: CPTII,,, | Performed by: NURSE PRACTITIONER

## 2025-06-12 PROCEDURE — 99214 OFFICE O/P EST MOD 30 MIN: CPT | Mod: PBBFAC | Performed by: NURSE PRACTITIONER

## 2025-06-12 PROCEDURE — 99396 PREV VISIT EST AGE 40-64: CPT | Mod: S$PBB,,, | Performed by: NURSE PRACTITIONER

## 2025-06-12 PROCEDURE — 3066F NEPHROPATHY DOC TX: CPT | Mod: CPTII,,, | Performed by: NURSE PRACTITIONER

## 2025-06-12 PROCEDURE — 3008F BODY MASS INDEX DOCD: CPT | Mod: CPTII,,, | Performed by: NURSE PRACTITIONER

## 2025-06-12 PROCEDURE — 3074F SYST BP LT 130 MM HG: CPT | Mod: CPTII,,, | Performed by: NURSE PRACTITIONER

## 2025-06-12 PROCEDURE — 3078F DIAST BP <80 MM HG: CPT | Mod: CPTII,,, | Performed by: NURSE PRACTITIONER

## 2025-06-12 NOTE — PROGRESS NOTES
[facility] -  Gynecology / Women's Health Clinic      Subjective:       Patient ID: Sandra Vega is a 55 y.o. female.    Chief Complaint:  Gynecologic Exam    History of Present Illness  The patient  here for annual exam. Pt is perimenopausal, cycles occur every other month per pt. Her LMP was 25. Period last 5 days and changes pads 2-3x/day. Admits hot flashes/night sweats, states improve when she stakes pill for anxiety. Denies history of abnormal paps. Last pap  NIL and HPV neg. MG-23 & BIRADS 1. Denies breast or urinary complaints. Denies pelvic pain, abnormal bleeding or discharge. Pt reports no STIs in the past and no concerns. Pt states she is currently not sexually active. Denies tobacco use. Dep. screening 0. Denies fly hx of breast, ovarian, uterine or colon cancer.      GYN & OB History  Patient's last menstrual period was 2025 (exact date).   Date of Last Pap: 2023    Review of patient's allergies indicates:  No Known Allergies  Past Medical History:   Diagnosis Date    Anemia     Anxiety disorder, unspecified     Diabetes mellitus     GERD (gastroesophageal reflux disease)     Primary hypertension 2023     OB History    Para Term  AB Living   3 2       SAB IAB Ectopic Multiple Live Births             # Outcome Date GA Lbr Pedro/2nd Weight Sex Type Anes PTL Lv   3             2 Para            1 Para                 Review of Systems  Review of Systems    Negative except for pertinent findings for positives per HPI     Objective:    Physical Exam    /73 (BP Location: Right arm, Patient Position: Sitting)   Pulse 72   Temp 98.1 °F (36.7 °C) (Oral)   Resp 20   Ht 5' (1.524 m)   Wt 70 kg (154 lb 6.4 oz)   LMP 2025 (Exact Date)   SpO2 100%   BMI 30.15 kg/m²   GENERAL: Well-developed female. No acute distress.    SKIN: Normal to inspection, warm and intact.  BREASTS: No rashes or erythema. No masses, lumps,  discharge, tenderness.  VULVA: General appearance normal; external genitalia with no lesions or erythema.  VAGINA: Mucosa/vaginal vault pink, no abnormal discharge or lesions.  CERVIX: Pink, parous appearing os, high and anterior in vaginal vault, no erythema or abnormal discharge.  BIMANUAL EXAM: reveals a 10 week-sized uterus. The uterus is regular, mobile, and non tender. Ab adnexa reveal no tenderness.  PSYCHIATRIC: Patient is oriented to person, place, and time. Mood and affect are normal.    Assessment:         ICD-10-CM ICD-9-CM   1. Encounter for annual routine gynecological examination  Z01.419 V72.31   2. Visit for screening mammogram  Z12.31 V76.12   3. Colon cancer screening  Z12.11 V76.51     Plan:   Sandra was seen today for gynecologic exam.    Diagnoses and all orders for this visit:    Encounter for annual routine gynecological examination    Visit for screening mammogram  -     Mammo Digital Screening Bilat w/ Angel (XPD); Future    Colon cancer screening  -     Cologuard Screening (Multitarget Stool DNA); Future  -     Cologuard Screening (Multitarget Stool DNA)    Pelvic today, pap utd per ACOG  MG ordered  Cologuard ordered  Follow up in about 1 year (around 6/12/2026) for annual exam.

## 2025-06-19 ENCOUNTER — HOSPITAL ENCOUNTER (OUTPATIENT)
Dept: RADIOLOGY | Facility: HOSPITAL | Age: 55
Discharge: HOME | End: 2025-06-19
Attending: NURSE PRACTITIONER
Payer: COMMERCIAL

## 2025-06-19 DIAGNOSIS — Z12.31 VISIT FOR SCREENING MAMMOGRAM: ICD-10-CM

## 2025-06-19 PROCEDURE — 77063 BREAST TOMOSYNTHESIS BI: CPT | Mod: 26,,, | Performed by: RADIOLOGY

## 2025-06-19 PROCEDURE — 77063 BREAST TOMOSYNTHESIS BI: CPT | Mod: TC

## 2025-06-19 PROCEDURE — 77067 SCR MAMMO BI INCL CAD: CPT | Mod: 26,,, | Performed by: RADIOLOGY

## 2025-07-15 ENCOUNTER — PATIENT MESSAGE (OUTPATIENT)
Facility: CLINIC | Age: 55
End: 2025-07-15
Payer: COMMERCIAL